# Patient Record
Sex: MALE | Race: WHITE | Employment: OTHER | URBAN - METROPOLITAN AREA
[De-identification: names, ages, dates, MRNs, and addresses within clinical notes are randomized per-mention and may not be internally consistent; named-entity substitution may affect disease eponyms.]

---

## 2018-12-07 ENCOUNTER — HOSPITAL ENCOUNTER (OUTPATIENT)
Dept: PREADMISSION TESTING | Age: 68
Discharge: HOME | End: 2018-12-07
Payer: MEDICARE

## 2018-12-07 LAB
ALBUMIN SERPL-MCNC: 3.9 G/DL (ref 3.4–5)
ALBUMIN/GLOB SERPL: 1.1 {RATIO} (ref 0.8–1.7)
ALP SERPL-CCNC: 67 U/L (ref 45–117)
ALT SERPL-CCNC: 19 U/L (ref 16–61)
ANION GAP SERPL CALC-SCNC: 8 MMOL/L (ref 3–18)
APPEARANCE UR: CLEAR
APTT PPP: 32.1 SEC (ref 23–36.4)
AST SERPL-CCNC: 15 U/L (ref 15–37)
BACTERIA SPEC CULT: NORMAL
BASOPHILS # BLD: 0.1 K/UL (ref 0–0.1)
BASOPHILS NFR BLD: 1 % (ref 0–2)
BILIRUB SERPL-MCNC: 0.5 MG/DL (ref 0.2–1)
BILIRUB UR QL: NEGATIVE
BUN SERPL-MCNC: 24 MG/DL (ref 7–18)
BUN/CREAT SERPL: 18
CALCIUM SERPL-MCNC: 8.7 MG/DL (ref 8.5–10.1)
CHLORIDE SERPL-SCNC: 103 MMOL/L (ref 100–108)
CO2 SERPL-SCNC: 26 MMOL/L (ref 21–32)
COLOR UR: YELLOW
CREAT SERPL-MCNC: 1.32 MG/DL (ref 0.6–1.3)
CRP SERPL-MCNC: 1 MG/DL (ref 0–0.3)
DIFFERENTIAL METHOD BLD: ABNORMAL
EOSINOPHIL # BLD: 0.4 K/UL (ref 0–0.4)
EOSINOPHIL NFR BLD: 4 % (ref 0–5)
ERYTHROCYTE [DISTWIDTH] IN BLOOD BY AUTOMATED COUNT: 13.8 % (ref 11.6–14.5)
ERYTHROCYTE [SEDIMENTATION RATE] IN BLOOD: 7 MM/HR (ref 0–20)
EST. AVERAGE GLUCOSE BLD GHB EST-MCNC: 137 MG/DL
GLOBULIN SER CALC-MCNC: 3.5 G/DL (ref 2–4)
GLUCOSE SERPL-MCNC: 110 MG/DL (ref 74–99)
GLUCOSE UR STRIP.AUTO-MCNC: NEGATIVE MG/DL
HBA1C MFR BLD: 6.4 % (ref 4.2–5.6)
HCT VFR BLD AUTO: 47.7 % (ref 36–48)
HGB BLD-MCNC: 15.2 G/DL (ref 13–16)
HGB UR QL STRIP: NEGATIVE
INR PPP: 0.9 (ref 0.8–1.2)
KETONES UR QL STRIP.AUTO: NEGATIVE MG/DL
LEUKOCYTE ESTERASE UR QL STRIP.AUTO: NEGATIVE
LYMPHOCYTES # BLD: 2.9 K/UL (ref 0.9–3.6)
LYMPHOCYTES NFR BLD: 27 % (ref 21–52)
MCH RBC QN AUTO: 26.6 PG (ref 24–34)
MCHC RBC AUTO-ENTMCNC: 31.9 G/DL (ref 31–37)
MCV RBC AUTO: 83.4 FL (ref 74–97)
MONOCYTES # BLD: 0.9 K/UL (ref 0.05–1.2)
MONOCYTES NFR BLD: 8 % (ref 3–10)
NEUTS SEG # BLD: 6.6 K/UL (ref 1.8–8)
NEUTS SEG NFR BLD: 60 % (ref 40–73)
NITRITE UR QL STRIP.AUTO: NEGATIVE
PH UR STRIP: 5.5 [PH] (ref 5–8)
PLATELET # BLD AUTO: 249 K/UL (ref 135–420)
PMV BLD AUTO: 9.9 FL (ref 9.2–11.8)
POTASSIUM SERPL-SCNC: 4.1 MMOL/L (ref 3.5–5.5)
PROT SERPL-MCNC: 7.4 G/DL (ref 6.4–8.2)
PROT UR STRIP-MCNC: NEGATIVE MG/DL
PROTHROMBIN TIME: 12 SEC (ref 11.5–15.2)
RBC # BLD AUTO: 5.72 M/UL (ref 4.7–5.5)
SERVICE CMNT-IMP: NORMAL
SODIUM SERPL-SCNC: 137 MMOL/L (ref 136–145)
SP GR UR REFRACTOMETRY: 1.02 (ref 1–1.03)
UROBILINOGEN UR QL STRIP.AUTO: 1 EU/DL (ref 0.2–1)
WBC # BLD AUTO: 10.8 K/UL (ref 4.6–13.2)

## 2018-12-07 PROCEDURE — 85730 THROMBOPLASTIN TIME PARTIAL: CPT

## 2018-12-07 PROCEDURE — 87086 URINE CULTURE/COLONY COUNT: CPT

## 2018-12-07 PROCEDURE — 87641 MR-STAPH DNA AMP PROBE: CPT

## 2018-12-07 PROCEDURE — 81003 URINALYSIS AUTO W/O SCOPE: CPT

## 2018-12-07 PROCEDURE — 85025 COMPLETE CBC W/AUTO DIFF WBC: CPT

## 2018-12-07 PROCEDURE — 93005 ELECTROCARDIOGRAM TRACING: CPT

## 2018-12-07 PROCEDURE — 85652 RBC SED RATE AUTOMATED: CPT

## 2018-12-07 PROCEDURE — 80053 COMPREHEN METABOLIC PANEL: CPT

## 2018-12-07 PROCEDURE — 83036 HEMOGLOBIN GLYCOSYLATED A1C: CPT

## 2018-12-07 PROCEDURE — 85610 PROTHROMBIN TIME: CPT

## 2018-12-07 PROCEDURE — 36415 COLL VENOUS BLD VENIPUNCTURE: CPT

## 2018-12-07 PROCEDURE — 86140 C-REACTIVE PROTEIN: CPT

## 2018-12-08 LAB
ATRIAL RATE: 75 BPM
CALCULATED P AXIS, ECG09: -64 DEGREES
CALCULATED R AXIS, ECG10: 34 DEGREES
CALCULATED T AXIS, ECG11: 51 DEGREES
DIAGNOSIS, 93000: NORMAL
P-R INTERVAL, ECG05: 186 MS
Q-T INTERVAL, ECG07: 386 MS
QRS DURATION, ECG06: 98 MS
QTC CALCULATION (BEZET), ECG08: 431 MS
VENTRICULAR RATE, ECG03: 75 BPM

## 2018-12-09 LAB
BACTERIA SPEC CULT: NORMAL
SERVICE CMNT-IMP: NORMAL

## 2019-01-01 ENCOUNTER — ANESTHESIA EVENT (OUTPATIENT)
Dept: SURGERY | Age: 69
DRG: 470 | End: 2019-01-01
Payer: MEDICARE

## 2019-01-01 NOTE — H&P
[address] History and Physical Exam 
 
Patient: Shana Lovett MRN: [id number]  SSN: [id number] YOB: 1950  Age: 80 y.o. Sex: male Subjective: Chief Complaint: Left knee pain History of Present Illness:  Patient complains of knee pain on the affected side and difficulty ambulating. Pain is increased with increasing activity. Pain is increased with weight bearing. Pain interferes with activities of daily living. Patient has noticed decreased range of motion. Admitting patient as inpatient acknowledging increased risk of anesthesia post-op monitoring due to adverse effects to anesthesia in the past and hypertension. Past Medical History:  
Diagnosis Date  Adverse effect of anesthesia   
 bp dropped when given meds for colonoscopy  Arthritis  Hypertension many years  Ill-defined condition 1 episode of irregular heartbeat 15 years ago, was checked, no problems found Past Surgical History:  
Procedure Laterality Date  HX OTHER SURGICAL    
 colonoscopy  HX TONSILLECTOMY  very young Social History Occupational History  Not on file Tobacco Use  Smoking status: Never Smoker  Smokeless tobacco: Never Used Substance and Sexual Activity  Alcohol use: Yes Frequency: Never Comment: rare  Drug use: No  
 Sexual activity: Not on file Prior to Admission medications Medication Sig Start Date End Date Taking? Authorizing Provider  
metoprolol tartrate (LOPRESSOR) 50 mg tablet Take 75 mg by mouth nightly. Provider, Historical  
valsartan-hydroCHLOROthiazide (DIOVAN-HCT) 160-25 mg per tablet Take 1 Tab by mouth daily. Provider, Historical  
aspirin delayed-release 81 mg tablet Take 81 mg by mouth nightly. Provider, Historical  
multivitamin (DAILY MULTIPLE) tablet Take 1 Tab by mouth daily. Provider, Historical  
 
 
Family History Non-contributory. Allergies: No known drug allergies Review of Systems: A comprehensive review of systems was negative. Objective:   
  
Physical Exam: 
General:  Alert, oriented, pleasant, well-groomed HEENT:  Normocephalic, atraumatic Lungs:   Clear to auscultation Heart:    Regular rate and rhythm Abdomen:  Soft, non-tender Extremities:   Pain with range of motion of the affected knee ROM: decreased Crepitus with motion of affected knee Mild effusion Xrays:  
Xrays demonstrate severe arthritic changes including sunchondral cysts, subchondral sclerosis, periarticular osteophytes, and joint space narrowing. Assessment:   
 
Arthritis of the affected knee. This has significantly affected the patient's quality of life. It interferes with activities of daily living. It is worse with weight bearing and activity. Plan:    
 
Proceed with scheduled left total knee. The patient has failed conservative measures including anti-inflammatories, injections, activity modification, and a trial of physical therapy or external joint support which includes a home exercise program. 
 
The various methods of treatment have been discussed with the patient and family. After consideration of risks, benefits, and other options for treatment, the patient has consented to surgical interventions. Questions were answered and preoperative teaching was done by Dr. Florentino Ulloa. Signed By: Viktoriya Santamaria PA-C January 1, 2019

## 2019-01-02 ENCOUNTER — APPOINTMENT (OUTPATIENT)
Dept: GENERAL RADIOLOGY | Age: 69
DRG: 470 | End: 2019-01-02
Attending: PHYSICIAN ASSISTANT
Payer: MEDICARE

## 2019-01-02 ENCOUNTER — HOSPITAL ENCOUNTER (INPATIENT)
Age: 69
LOS: 1 days | Discharge: HOME HEALTH | DRG: 470 | End: 2019-01-03
Attending: ORTHOPAEDIC SURGERY | Admitting: ORTHOPAEDIC SURGERY
Payer: MEDICARE

## 2019-01-02 ENCOUNTER — ANESTHESIA (OUTPATIENT)
Dept: SURGERY | Age: 69
DRG: 470 | End: 2019-01-02
Payer: MEDICARE

## 2019-01-02 DIAGNOSIS — M17.12 PRIMARY OSTEOARTHRITIS OF LEFT KNEE: Primary | ICD-10-CM

## 2019-01-02 LAB
ABO + RH BLD: NORMAL
BLOOD GROUP ANTIBODIES SERPL: NORMAL
GLUCOSE BLD STRIP.AUTO-MCNC: 172 MG/DL (ref 70–110)
SPECIMEN EXP DATE BLD: NORMAL

## 2019-01-02 PROCEDURE — 77030031139 HC SUT VCRL2 J&J -A: Performed by: ORTHOPAEDIC SURGERY

## 2019-01-02 PROCEDURE — 77030027138 HC INCENT SPIROMETER -A: Performed by: ORTHOPAEDIC SURGERY

## 2019-01-02 PROCEDURE — 77030039267 HC ADH SKN EXOFIN S2SG -B: Performed by: ORTHOPAEDIC SURGERY

## 2019-01-02 PROCEDURE — 74011250636 HC RX REV CODE- 250/636: Performed by: ORTHOPAEDIC SURGERY

## 2019-01-02 PROCEDURE — 82962 GLUCOSE BLOOD TEST: CPT

## 2019-01-02 PROCEDURE — C9290 INJ, BUPIVACAINE LIPOSOME: HCPCS | Performed by: ORTHOPAEDIC SURGERY

## 2019-01-02 PROCEDURE — 74011250636 HC RX REV CODE- 250/636: Performed by: PHYSICIAN ASSISTANT

## 2019-01-02 PROCEDURE — 74011000250 HC RX REV CODE- 250

## 2019-01-02 PROCEDURE — 74011000258 HC RX REV CODE- 258: Performed by: PHYSICIAN ASSISTANT

## 2019-01-02 PROCEDURE — 77030037875 HC DRSG MEPILEX <16IN BORD MOLN -A: Performed by: ORTHOPAEDIC SURGERY

## 2019-01-02 PROCEDURE — 77030018846 HC SOL IRR STRL H20 ICUM -A: Performed by: ORTHOPAEDIC SURGERY

## 2019-01-02 PROCEDURE — 77030020782 HC GWN BAIR PAWS FLX 3M -B: Performed by: ORTHOPAEDIC SURGERY

## 2019-01-02 PROCEDURE — 76010000153 HC OR TIME 1.5 TO 2 HR: Performed by: ORTHOPAEDIC SURGERY

## 2019-01-02 PROCEDURE — 77010033678 HC OXYGEN DAILY

## 2019-01-02 PROCEDURE — 86900 BLOOD TYPING SEROLOGIC ABO: CPT

## 2019-01-02 PROCEDURE — 74011250636 HC RX REV CODE- 250/636

## 2019-01-02 PROCEDURE — 77030018835 HC SOL IRR LR ICUM -A: Performed by: ORTHOPAEDIC SURGERY

## 2019-01-02 PROCEDURE — 74011250637 HC RX REV CODE- 250/637: Performed by: PHYSICIAN ASSISTANT

## 2019-01-02 PROCEDURE — 77030018836 HC SOL IRR NACL ICUM -A: Performed by: ORTHOPAEDIC SURGERY

## 2019-01-02 PROCEDURE — 77030032489 HC SLV COMPR SCD FT CUF COVD -B: Performed by: ORTHOPAEDIC SURGERY

## 2019-01-02 PROCEDURE — 64447 NJX AA&/STRD FEMORAL NRV IMG: CPT | Performed by: ORTHOPAEDIC SURGERY

## 2019-01-02 PROCEDURE — 76942 ECHO GUIDE FOR BIOPSY: CPT | Performed by: ORTHOPAEDIC SURGERY

## 2019-01-02 PROCEDURE — 74011000258 HC RX REV CODE- 258

## 2019-01-02 PROCEDURE — 0SRD0JA REPLACEMENT OF LEFT KNEE JOINT WITH SYNTHETIC SUBSTITUTE, UNCEMENTED, OPEN APPROACH: ICD-10-PCS | Performed by: ORTHOPAEDIC SURGERY

## 2019-01-02 PROCEDURE — 76060000034 HC ANESTHESIA 1.5 TO 2 HR: Performed by: ORTHOPAEDIC SURGERY

## 2019-01-02 PROCEDURE — 65270000029 HC RM PRIVATE

## 2019-01-02 PROCEDURE — 77030035643 HC BLD SAW OSC PRECIS STRY -C: Performed by: ORTHOPAEDIC SURGERY

## 2019-01-02 PROCEDURE — 74011000250 HC RX REV CODE- 250: Performed by: ORTHOPAEDIC SURGERY

## 2019-01-02 PROCEDURE — 74011000250 HC RX REV CODE- 250: Performed by: PHYSICIAN ASSISTANT

## 2019-01-02 PROCEDURE — 36415 COLL VENOUS BLD VENIPUNCTURE: CPT

## 2019-01-02 PROCEDURE — 77030000032 HC CUF TRNQT ZIMM -B: Performed by: ORTHOPAEDIC SURGERY

## 2019-01-02 PROCEDURE — 74011000258 HC RX REV CODE- 258: Performed by: ORTHOPAEDIC SURGERY

## 2019-01-02 PROCEDURE — 74011636637 HC RX REV CODE- 636/637: Performed by: PHYSICIAN ASSISTANT

## 2019-01-02 PROCEDURE — C1776 JOINT DEVICE (IMPLANTABLE): HCPCS | Performed by: ORTHOPAEDIC SURGERY

## 2019-01-02 PROCEDURE — 77030002933 HC SUT MCRYL J&J -A: Performed by: ORTHOPAEDIC SURGERY

## 2019-01-02 PROCEDURE — 77030036563 HC WRP CLD THER KNE S2SG -B: Performed by: ORTHOPAEDIC SURGERY

## 2019-01-02 PROCEDURE — 73560 X-RAY EXAM OF KNEE 1 OR 2: CPT

## 2019-01-02 PROCEDURE — 77030013708 HC HNDPC SUC IRR PULS STRY –B: Performed by: ORTHOPAEDIC SURGERY

## 2019-01-02 PROCEDURE — 74011250637 HC RX REV CODE- 250/637: Performed by: SPECIALIST

## 2019-01-02 PROCEDURE — C1713 ANCHOR/SCREW BN/BN,TIS/BN: HCPCS | Performed by: ORTHOPAEDIC SURGERY

## 2019-01-02 PROCEDURE — 76210000006 HC OR PH I REC 0.5 TO 1 HR: Performed by: ORTHOPAEDIC SURGERY

## 2019-01-02 DEVICE — IMPLANTABLE DEVICE: Type: IMPLANTABLE DEVICE | Site: KNEE | Status: FUNCTIONAL

## 2019-01-02 DEVICE — BASEPLATE TIB SZ 6 AP52MM ML77MM KNEE TRITANIUM 4 CRUCFRM: Type: IMPLANTABLE DEVICE | Site: KNEE | Status: FUNCTIONAL

## 2019-01-02 DEVICE — COMPONENT PAT SZ A40 W44MM DIA40MM THK11MM MED LAT KNEE: Type: IMPLANTABLE DEVICE | Site: KNEE | Status: FUNCTIONAL

## 2019-01-02 DEVICE — COMPONENT TOT KNEE HYBRID POROUS X3 TRIATHLON: Type: IMPLANTABLE DEVICE | Site: KNEE | Status: FUNCTIONAL

## 2019-01-02 DEVICE — COMPNT FEM CR TRIATHLN 5 L PA --: Type: IMPLANTABLE DEVICE | Site: KNEE | Status: FUNCTIONAL

## 2019-01-02 RX ORDER — HYDROMORPHONE HYDROCHLORIDE 2 MG/ML
0.2 INJECTION, SOLUTION INTRAMUSCULAR; INTRAVENOUS; SUBCUTANEOUS
Status: DISCONTINUED | OUTPATIENT
Start: 2019-01-02 | End: 2019-01-02 | Stop reason: HOSPADM

## 2019-01-02 RX ORDER — MAGNESIUM SULFATE 100 %
4 CRYSTALS MISCELLANEOUS AS NEEDED
Status: DISCONTINUED | OUTPATIENT
Start: 2019-01-02 | End: 2019-01-03 | Stop reason: HOSPADM

## 2019-01-02 RX ORDER — DEXTROSE 50 % IN WATER (D50W) INTRAVENOUS SYRINGE
25-50 AS NEEDED
Status: DISCONTINUED | OUTPATIENT
Start: 2019-01-02 | End: 2019-01-03 | Stop reason: HOSPADM

## 2019-01-02 RX ORDER — PROPOFOL 10 MG/ML
INJECTION, EMULSION INTRAVENOUS AS NEEDED
Status: DISCONTINUED | OUTPATIENT
Start: 2019-01-02 | End: 2019-01-02 | Stop reason: HOSPADM

## 2019-01-02 RX ORDER — CELECOXIB 100 MG/1
200 CAPSULE ORAL 2 TIMES DAILY
Status: DISCONTINUED | OUTPATIENT
Start: 2019-01-02 | End: 2019-01-03 | Stop reason: HOSPADM

## 2019-01-02 RX ORDER — INSULIN LISPRO 100 [IU]/ML
INJECTION, SOLUTION INTRAVENOUS; SUBCUTANEOUS
Status: DISCONTINUED | OUTPATIENT
Start: 2019-01-02 | End: 2019-01-03 | Stop reason: HOSPADM

## 2019-01-02 RX ORDER — HYDROMORPHONE HYDROCHLORIDE 1 MG/ML
1 INJECTION, SOLUTION INTRAMUSCULAR; INTRAVENOUS; SUBCUTANEOUS
Status: DISCONTINUED | OUTPATIENT
Start: 2019-01-02 | End: 2019-01-03 | Stop reason: HOSPADM

## 2019-01-02 RX ORDER — LIDOCAINE HYDROCHLORIDE 20 MG/ML
INJECTION, SOLUTION EPIDURAL; INFILTRATION; INTRACAUDAL; PERINEURAL AS NEEDED
Status: DISCONTINUED | OUTPATIENT
Start: 2019-01-02 | End: 2019-01-02 | Stop reason: HOSPADM

## 2019-01-02 RX ORDER — SODIUM CHLORIDE 9 MG/ML
125 INJECTION, SOLUTION INTRAVENOUS CONTINUOUS
Status: DISCONTINUED | OUTPATIENT
Start: 2019-01-02 | End: 2019-01-02 | Stop reason: HOSPADM

## 2019-01-02 RX ORDER — MAGNESIUM SULFATE 100 %
4 CRYSTALS MISCELLANEOUS AS NEEDED
Status: DISCONTINUED | OUTPATIENT
Start: 2019-01-02 | End: 2019-01-02 | Stop reason: HOSPADM

## 2019-01-02 RX ORDER — DIPHENHYDRAMINE HYDROCHLORIDE 50 MG/ML
12.5 INJECTION, SOLUTION INTRAMUSCULAR; INTRAVENOUS
Status: DISCONTINUED | OUTPATIENT
Start: 2019-01-02 | End: 2019-01-03 | Stop reason: HOSPADM

## 2019-01-02 RX ORDER — FENTANYL CITRATE 50 UG/ML
25 INJECTION, SOLUTION INTRAMUSCULAR; INTRAVENOUS AS NEEDED
Status: DISCONTINUED | OUTPATIENT
Start: 2019-01-02 | End: 2019-01-02 | Stop reason: HOSPADM

## 2019-01-02 RX ORDER — SODIUM CHLORIDE, SODIUM LACTATE, POTASSIUM CHLORIDE, CALCIUM CHLORIDE 600; 310; 30; 20 MG/100ML; MG/100ML; MG/100ML; MG/100ML
125 INJECTION, SOLUTION INTRAVENOUS CONTINUOUS
Status: DISCONTINUED | OUTPATIENT
Start: 2019-01-02 | End: 2019-01-03 | Stop reason: HOSPADM

## 2019-01-02 RX ORDER — CELECOXIB 100 MG/1
200 CAPSULE ORAL ONCE
Status: COMPLETED | OUTPATIENT
Start: 2019-01-02 | End: 2019-01-02

## 2019-01-02 RX ORDER — DEXMEDETOMIDINE HYDROCHLORIDE 4 UG/ML
INJECTION, SOLUTION INTRAVENOUS AS NEEDED
Status: DISCONTINUED | OUTPATIENT
Start: 2019-01-02 | End: 2019-01-02 | Stop reason: HOSPADM

## 2019-01-02 RX ORDER — SODIUM CHLORIDE 0.9 % (FLUSH) 0.9 %
5-10 SYRINGE (ML) INJECTION EVERY 8 HOURS
Status: DISCONTINUED | OUTPATIENT
Start: 2019-01-02 | End: 2019-01-03 | Stop reason: HOSPADM

## 2019-01-02 RX ORDER — METOCLOPRAMIDE HYDROCHLORIDE 5 MG/ML
INJECTION INTRAMUSCULAR; INTRAVENOUS AS NEEDED
Status: DISCONTINUED | OUTPATIENT
Start: 2019-01-02 | End: 2019-01-02 | Stop reason: HOSPADM

## 2019-01-02 RX ORDER — DOCUSATE SODIUM 100 MG/1
100 CAPSULE, LIQUID FILLED ORAL 2 TIMES DAILY
Status: DISCONTINUED | OUTPATIENT
Start: 2019-01-02 | End: 2019-01-03 | Stop reason: HOSPADM

## 2019-01-02 RX ORDER — ACETAMINOPHEN 500 MG
1000 TABLET ORAL ONCE
Status: COMPLETED | OUTPATIENT
Start: 2019-01-02 | End: 2019-01-02

## 2019-01-02 RX ORDER — KETAMINE HYDROCHLORIDE 10 MG/ML
INJECTION, SOLUTION INTRAMUSCULAR; INTRAVENOUS AS NEEDED
Status: DISCONTINUED | OUTPATIENT
Start: 2019-01-02 | End: 2019-01-02 | Stop reason: HOSPADM

## 2019-01-02 RX ORDER — NALOXONE HYDROCHLORIDE 0.4 MG/ML
0.1 INJECTION, SOLUTION INTRAMUSCULAR; INTRAVENOUS; SUBCUTANEOUS AS NEEDED
Status: DISCONTINUED | OUTPATIENT
Start: 2019-01-02 | End: 2019-01-02 | Stop reason: HOSPADM

## 2019-01-02 RX ORDER — GLYCOPYRROLATE 0.2 MG/ML
INJECTION INTRAMUSCULAR; INTRAVENOUS AS NEEDED
Status: DISCONTINUED | OUTPATIENT
Start: 2019-01-02 | End: 2019-01-02 | Stop reason: HOSPADM

## 2019-01-02 RX ORDER — PHENYLEPHRINE HCL IN 0.9% NACL 1 MG/10 ML
SYRINGE (ML) INTRAVENOUS AS NEEDED
Status: DISCONTINUED | OUTPATIENT
Start: 2019-01-02 | End: 2019-01-02 | Stop reason: HOSPADM

## 2019-01-02 RX ORDER — MIDAZOLAM HYDROCHLORIDE 1 MG/ML
INJECTION, SOLUTION INTRAMUSCULAR; INTRAVENOUS AS NEEDED
Status: DISCONTINUED | OUTPATIENT
Start: 2019-01-02 | End: 2019-01-02 | Stop reason: HOSPADM

## 2019-01-02 RX ORDER — PREGABALIN 25 MG/1
25 CAPSULE ORAL ONCE
Status: COMPLETED | OUTPATIENT
Start: 2019-01-02 | End: 2019-01-02

## 2019-01-02 RX ORDER — MORPHINE SULFATE 2 MG/ML
2 INJECTION, SOLUTION INTRAMUSCULAR; INTRAVENOUS
Status: DISCONTINUED | OUTPATIENT
Start: 2019-01-02 | End: 2019-01-02 | Stop reason: HOSPADM

## 2019-01-02 RX ORDER — NALOXONE HYDROCHLORIDE 0.4 MG/ML
0.4 INJECTION, SOLUTION INTRAMUSCULAR; INTRAVENOUS; SUBCUTANEOUS AS NEEDED
Status: DISCONTINUED | OUTPATIENT
Start: 2019-01-02 | End: 2019-01-03 | Stop reason: HOSPADM

## 2019-01-02 RX ORDER — DEXAMETHASONE SODIUM PHOSPHATE 4 MG/ML
INJECTION, SOLUTION INTRA-ARTICULAR; INTRALESIONAL; INTRAMUSCULAR; INTRAVENOUS; SOFT TISSUE AS NEEDED
Status: DISCONTINUED | OUTPATIENT
Start: 2019-01-02 | End: 2019-01-02 | Stop reason: HOSPADM

## 2019-01-02 RX ORDER — FENTANYL CITRATE 50 UG/ML
50 INJECTION, SOLUTION INTRAMUSCULAR; INTRAVENOUS
Status: DISCONTINUED | OUTPATIENT
Start: 2019-01-02 | End: 2019-01-02 | Stop reason: HOSPADM

## 2019-01-02 RX ORDER — ONDANSETRON 2 MG/ML
4 INJECTION INTRAMUSCULAR; INTRAVENOUS ONCE
Status: DISCONTINUED | OUTPATIENT
Start: 2019-01-02 | End: 2019-01-02 | Stop reason: HOSPADM

## 2019-01-02 RX ORDER — MORPHINE SULFATE 2 MG/ML
1 INJECTION, SOLUTION INTRAMUSCULAR; INTRAVENOUS
Status: DISCONTINUED | OUTPATIENT
Start: 2019-01-02 | End: 2019-01-02 | Stop reason: HOSPADM

## 2019-01-02 RX ORDER — DEXTROSE 50 % IN WATER (D50W) INTRAVENOUS SYRINGE
25-50 AS NEEDED
Status: DISCONTINUED | OUTPATIENT
Start: 2019-01-02 | End: 2019-01-02 | Stop reason: HOSPADM

## 2019-01-02 RX ORDER — SODIUM CHLORIDE, SODIUM LACTATE, POTASSIUM CHLORIDE, CALCIUM CHLORIDE 600; 310; 30; 20 MG/100ML; MG/100ML; MG/100ML; MG/100ML
300 INJECTION, SOLUTION INTRAVENOUS CONTINUOUS
Status: DISCONTINUED | OUTPATIENT
Start: 2019-01-02 | End: 2019-01-03 | Stop reason: HOSPADM

## 2019-01-02 RX ORDER — SODIUM CHLORIDE 0.9 % (FLUSH) 0.9 %
5-10 SYRINGE (ML) INJECTION AS NEEDED
Status: DISCONTINUED | OUTPATIENT
Start: 2019-01-02 | End: 2019-01-03 | Stop reason: HOSPADM

## 2019-01-02 RX ORDER — ONDANSETRON 2 MG/ML
4 INJECTION INTRAMUSCULAR; INTRAVENOUS
Status: DISCONTINUED | OUTPATIENT
Start: 2019-01-02 | End: 2019-01-03 | Stop reason: HOSPADM

## 2019-01-02 RX ORDER — OXYCODONE AND ACETAMINOPHEN 5; 325 MG/1; MG/1
1-2 TABLET ORAL
Status: DISCONTINUED | OUTPATIENT
Start: 2019-01-02 | End: 2019-01-03 | Stop reason: HOSPADM

## 2019-01-02 RX ORDER — ONDANSETRON 2 MG/ML
INJECTION INTRAMUSCULAR; INTRAVENOUS AS NEEDED
Status: DISCONTINUED | OUTPATIENT
Start: 2019-01-02 | End: 2019-01-02 | Stop reason: HOSPADM

## 2019-01-02 RX ORDER — HYDROMORPHONE HYDROCHLORIDE 2 MG/ML
0.5 INJECTION, SOLUTION INTRAMUSCULAR; INTRAVENOUS; SUBCUTANEOUS
Status: DISCONTINUED | OUTPATIENT
Start: 2019-01-02 | End: 2019-01-02 | Stop reason: HOSPADM

## 2019-01-02 RX ADMIN — MIDAZOLAM HYDROCHLORIDE 2 MG: 1 INJECTION, SOLUTION INTRAMUSCULAR; INTRAVENOUS at 15:40

## 2019-01-02 RX ADMIN — LIDOCAINE HYDROCHLORIDE 40 MG: 20 INJECTION, SOLUTION EPIDURAL; INFILTRATION; INTRACAUDAL; PERINEURAL at 15:47

## 2019-01-02 RX ADMIN — METOPROLOL TARTRATE 75 MG: 50 TABLET ORAL at 23:02

## 2019-01-02 RX ADMIN — Medication 150 MCG: at 16:48

## 2019-01-02 RX ADMIN — CEFAZOLIN SODIUM 3 G: 10 INJECTION, POWDER, FOR SOLUTION INTRAVENOUS at 15:51

## 2019-01-02 RX ADMIN — Medication 150 MCG: at 16:36

## 2019-01-02 RX ADMIN — METOCLOPRAMIDE HYDROCHLORIDE 10 MG: 5 INJECTION INTRAMUSCULAR; INTRAVENOUS at 16:01

## 2019-01-02 RX ADMIN — GLYCOPYRROLATE 0.1 MG: 0.2 INJECTION INTRAMUSCULAR; INTRAVENOUS at 16:09

## 2019-01-02 RX ADMIN — Medication 100 MCG: at 17:00

## 2019-01-02 RX ADMIN — Medication 200 MCG: at 16:58

## 2019-01-02 RX ADMIN — GLYCOPYRROLATE 0.1 MG: 0.2 INJECTION INTRAMUSCULAR; INTRAVENOUS at 16:07

## 2019-01-02 RX ADMIN — CELECOXIB 200 MG: 100 CAPSULE ORAL at 13:25

## 2019-01-02 RX ADMIN — PROPOFOL 200 MG: 10 INJECTION, EMULSION INTRAVENOUS at 15:47

## 2019-01-02 RX ADMIN — Medication 150 MCG: at 16:42

## 2019-01-02 RX ADMIN — CELECOXIB 200 MG: 100 CAPSULE ORAL at 21:10

## 2019-01-02 RX ADMIN — SODIUM CHLORIDE, SODIUM LACTATE, POTASSIUM CHLORIDE, AND CALCIUM CHLORIDE 125 ML/HR: 600; 310; 30; 20 INJECTION, SOLUTION INTRAVENOUS at 17:50

## 2019-01-02 RX ADMIN — DEXMEDETOMIDINE HYDROCHLORIDE 8 MCG: 4 INJECTION, SOLUTION INTRAVENOUS at 16:04

## 2019-01-02 RX ADMIN — KETAMINE HYDROCHLORIDE 20 MG: 10 INJECTION, SOLUTION INTRAMUSCULAR; INTRAVENOUS at 15:54

## 2019-01-02 RX ADMIN — PREGABALIN 25 MG: 25 CAPSULE ORAL at 13:25

## 2019-01-02 RX ADMIN — KETAMINE HYDROCHLORIDE 10 MG: 10 INJECTION, SOLUTION INTRAMUSCULAR; INTRAVENOUS at 16:23

## 2019-01-02 RX ADMIN — DEXMEDETOMIDINE HYDROCHLORIDE 8 MCG: 4 INJECTION, SOLUTION INTRAVENOUS at 16:14

## 2019-01-02 RX ADMIN — ONDANSETRON 4 MG: 2 INJECTION INTRAMUSCULAR; INTRAVENOUS at 16:00

## 2019-01-02 RX ADMIN — Medication 200 MCG: at 16:51

## 2019-01-02 RX ADMIN — DOCUSATE SODIUM 100 MG: 100 CAPSULE, LIQUID FILLED ORAL at 21:10

## 2019-01-02 RX ADMIN — KETAMINE HYDROCHLORIDE 10 MG: 10 INJECTION, SOLUTION INTRAMUSCULAR; INTRAVENOUS at 15:59

## 2019-01-02 RX ADMIN — DEXAMETHASONE SODIUM PHOSPHATE 4 MG: 4 INJECTION, SOLUTION INTRA-ARTICULAR; INTRALESIONAL; INTRAMUSCULAR; INTRAVENOUS; SOFT TISSUE at 16:26

## 2019-01-02 RX ADMIN — SODIUM CHLORIDE, SODIUM LACTATE, POTASSIUM CHLORIDE, AND CALCIUM CHLORIDE: 600; 310; 30; 20 INJECTION, SOLUTION INTRAVENOUS at 16:15

## 2019-01-02 RX ADMIN — SODIUM CHLORIDE, SODIUM LACTATE, POTASSIUM CHLORIDE, AND CALCIUM CHLORIDE 125 ML/HR: 600; 310; 30; 20 INJECTION, SOLUTION INTRAVENOUS at 13:16

## 2019-01-02 RX ADMIN — SODIUM CHLORIDE, SODIUM LACTATE, POTASSIUM CHLORIDE, AND CALCIUM CHLORIDE 125 ML/HR: 600; 310; 30; 20 INJECTION, SOLUTION INTRAVENOUS at 23:03

## 2019-01-02 RX ADMIN — TRANEXAMIC ACID 1 G: 100 INJECTION, SOLUTION INTRAVENOUS at 15:53

## 2019-01-02 RX ADMIN — KETAMINE HYDROCHLORIDE 10 MG: 10 INJECTION, SOLUTION INTRAMUSCULAR; INTRAVENOUS at 16:11

## 2019-01-02 RX ADMIN — SODIUM CHLORIDE, SODIUM LACTATE, POTASSIUM CHLORIDE, AND CALCIUM CHLORIDE 300 ML/HR: 600; 310; 30; 20 INJECTION, SOLUTION INTRAVENOUS at 21:10

## 2019-01-02 RX ADMIN — Medication 100 MCG: at 16:18

## 2019-01-02 RX ADMIN — Medication 150 MCG: at 16:56

## 2019-01-02 RX ADMIN — Medication 150 MCG: at 16:44

## 2019-01-02 RX ADMIN — INSULIN LISPRO 2 UNITS: 100 INJECTION, SOLUTION INTRAVENOUS; SUBCUTANEOUS at 23:03

## 2019-01-02 RX ADMIN — TRANEXAMIC ACID 1 G: 100 INJECTION, SOLUTION INTRAVENOUS at 16:43

## 2019-01-02 RX ADMIN — ACETAMINOPHEN 1000 MG: 500 TABLET ORAL at 13:25

## 2019-01-02 RX ADMIN — DEXMEDETOMIDINE HYDROCHLORIDE 8 MCG: 4 INJECTION, SOLUTION INTRAVENOUS at 16:09

## 2019-01-02 RX ADMIN — Medication 100 MCG: at 16:25

## 2019-01-02 RX ADMIN — KETAMINE HYDROCHLORIDE 10 MG: 10 INJECTION, SOLUTION INTRAMUSCULAR; INTRAVENOUS at 16:14

## 2019-01-02 NOTE — INTERVAL H&P NOTE
H&P Update: 
Sejal Piper was seen and examined. History and physical has been reviewed. The patient has been examined. There have been no significant clinical changes since the completion of the originally dated History and Physical. 
Patient identified by surgeon; surgical site was confirmed by patient and surgeon.  
 
Signed By: Kelly Cisneros MD   
 January 2, 2019 3:07 PM

## 2019-01-02 NOTE — PERIOP NOTES
TRANSFER - OUT REPORT: 
 
Verbal report given to Nahomi Sethi RN(name) on Brenna Garcia  being transferred to room 215(unit) for routine post - op Report consisted of patients Situation, Background, Assessment and  
Recommendations(SBAR). Information from the following report(s) SBAR was reviewed with the receiving nurse. Intake/Output Summary (Last 24 hours) at 1/2/2019 1829 Last data filed at 1/2/2019 1755 Gross per 24 hour Intake 2100 ml Output 50 ml Net 2050 ml Lines:  
Peripheral IV 01/02/19 Right; Inner Forearm (Active) Site Assessment Clean, dry, & intact 1/2/2019  5:55 PM  
Phlebitis Assessment 0 1/2/2019  5:55 PM  
Infiltration Assessment 0 1/2/2019  5:55 PM  
Dressing Status Clean, dry, & intact 1/2/2019  5:55 PM  
Dressing Type Transparent 1/2/2019  5:55 PM  
Hub Color/Line Status Green; Infusing 1/2/2019  5:55 PM  
Alcohol Cap Used No 1/2/2019  1:19 PM  
  
 
Opportunity for questions and clarification was provided. Patient transported with: 
 O2 @ 2 liters

## 2019-01-02 NOTE — PERIOP NOTES
Called into room 5 and spoke with circulator. Asked that she inform  that the patient has a few small abrasions to his left lower shin. She states that she will let him know as soon as possible so that he can assess the patient's leg before surgery.

## 2019-01-02 NOTE — BRIEF OP NOTE
BRIEF OPERATIVE NOTE Date of Procedure: 1/2/2019 Preoperative Diagnosis: UNILATERAL PRIMARY OSTEOARTHRITIS LEFT KNEE Postoperative Diagnosis: UNILATERAL PRIMARY OSTEOARTHRITIS LEFT KNEE Procedure(s): LEFT TOTAL KNEE ARTHROPLASTY Surgeon(s) and Role: Dilshad Jernigan MD - Primary Surgical Assistant: none Surgical Staff: 
Circ-1: Dena Daley RN Physician Assistant: Layla Antonio PA-C Scrub Tech-1: Vicky Zaragoza Scrub RN-1: Lisandro Marin RN Event Time In Time Out Incision Start  Incision Close 1713 Anesthesia: General  
Estimated Blood Loss: 50cc Specimens: * No specimens in log * Findings: severe DJD Complications: none Implants:  
Implant Name Type Inv. Item Serial No.  Lot No. LRB No. Used Action PAT ASYM MTL-BK 11MM SZ A40 -- TRIATHLON - [id number]  PAT ASYM MTL-BK 11MM SZ A40 -- TRIATHLON  [employer] HOW [id number] Left 1 Implanted INSERT TIB CR TRIATHLN X3 6 16 --  - [id number]  INSERT TIB CR TRIATHLN X3 6 16 --   [employer] HOW [id number] Left 1 Implanted BASEPLT TIB PC TRITNM SZ 6 -- TRIATHLON - [id number]  BASEPLT TIB PC TRITNM SZ 6 -- TRIATHLON  [facility] HOW [id number] Left 1 Implanted COMPNT FEM CR TRIATHLN 5 L PA --  - [id number]  COMPNT FEM CR TRIATHLN 5 L PA --   [employer] HOW [id number] Left 1 Implanted

## 2019-01-02 NOTE — PERIOP NOTES
Reviewed PTA medication list with patient/caregiver and patient/caregiver denies any additional medications. Patient admits to having a responsible adult care for them at home for at least 24 hours after surgery. Pt denies having an active cough and confirms being able to lie still in the supine position for at least 20 minutes. Patient denies domingo chewing/swallowing difficulties. Dual skin assessment & fall risk band verification completed with Chandrika Dash RN.

## 2019-01-02 NOTE — ANESTHESIA PREPROCEDURE EVALUATION
Anesthetic History No history of anesthetic complications Comments: Hypotension about 20 years ago =- colonoscopy after prep. Review of Systems / Medical History Patient summary reviewed, nursing notes reviewed and pertinent labs reviewed Pulmonary Pertinent negatives: No COPD, asthma and smoker Neuro/Psych Within defined limits Cardiovascular Hypertension: well controlled Pertinent negatives: No past MI and CAD 
 
  
GI/Hepatic/Renal 
Within defined limits Pertinent negatives: No GERD, liver disease and renal disease Endo/Other Arthritis Pertinent negatives: No diabetes Other Findings Comments: ETOH neg Physical Exam 
 
Airway Mallampati: III 
TM Distance: 4 - 6 cm Neck ROM: normal range of motion Mouth opening: Normal 
 
 Cardiovascular Dental 
 
Dentition: Caps/crowns Pulmonary Abdominal 
 
 
 
 Other Findings Anesthetic Plan ASA: 2 Anesthesia type: regional and general - femoral single shot Induction: Intravenous Anesthetic plan and risks discussed with: Patient Risk of nerve injury discussed. Can be expressed by pain, numbness, weakness. May or may not recover.

## 2019-01-02 NOTE — ANESTHESIA POSTPROCEDURE EVALUATION
Post-Anesthesia Evaluation & Assessment Visit Vitals /56 Pulse 79 Temp 36.8 °C (98.2 °F) Resp 15 Ht 5' 8\" (1.727 m) Wt 121.6 kg (268 lb) SpO2 96% BMI 40.75 kg/m² Nausea/Vomiting: Controlled. Post-operative hydration adequate. Pain Scale 1: FLACC (01/02/19 1755) Pain Intensity 1: 0 (01/02/19 1755) Managed Pain score at or below stated goal level. Mental status & Level of consciousness: alert and oriented x 3 Neurological status: moves all extremities, sensation grossly intact Pulmonary status: airway patent, adequate oxygenation. Complications related to anesthesia: none Patient has met all PACU discharge requirements.  
 
 
Kimberly Gutierrez DO

## 2019-01-02 NOTE — OP NOTES
TOTAL KNEE ARTHROPLASTY OP NOTE      OPERATIVE REPORT    Patient: Annette Ospina CSN: [id number] SSN: [id number]    YOB: 1950  Age: 80 y.o. Sex: male      Admit Date: 1/2/2019  Admit Diagnosis: UNILATERAL PRIMARY OSTEOARTHRITIS LEFT KNEE  Osteoarthritis of left knee    Date of Procedure: 1/2/2019   Preoperative Diagnosis: UNILATERAL PRIMARY OSTEOARTHRITIS LEFT KNEE  Postoperative Diagnosis: UNILATERAL PRIMARY OSTEOARTHRITIS LEFT KNEE    Procedure: Procedure(s):  LEFT TOTAL KNEE ARTHROPLASTY  Surgeon: Fermín Ellis MD  Assistant(s):  CASTILLO Davison  Anesthesia: General   Estimated Blood Loss:<50CC  Specimens: * No specimens in log *   Complications: None  Implants:   Implant Name Type Inv. Item Serial No.  Lot No. LRB No. Used Action   PAT ASYM MTL-BK 11MM SZ A40 -- TRIATHLON - [id number]  PAT ASYM MTL-BK 11MM SZ A40 -- TRIATHLON  [employer] [facility] [id number] Left 1 Implanted   INSERT TIB CR TRIATHLN X3 6 16 --  - [id number]  INSERT TIB CR TRIATHLN X3 6 16 --   [employer] HOW [id number] Left 1 Implanted   BASEPLT TIB PC TRITNM SZ 6 -- TRIATHLON - [id number]  BASEPLT TIB PC TRITNM SZ 6 -- TRIATHLON  [employer] HOW [id number] Left 1 Implanted   COMPNT FEM CR TRIATHLN 5 L PA --  - [id number]  COMPNT FEM CR TRIATHLN 5 L PA --   [employer] HOW [id number] Left 1 Implanted         INDICATIONS: The patient is a 80 y.o., male who has who has been suffering from knee arthritis. He has failed conservative therapy including activity modification, anti-inflammatories and injections. The arthritis is significantly impacting the patient's quality of life. We did discuss the option of total knee arthroplasty with them at length. He understood the risks and benefits including the risks of infection and blood clot, and elected to proceed with knee replacement.     DESCRIPTION OF PROCEDURE: After being informed of the risks and benefits, which include, but are not limited to, bleeding, infection, neurovascular damage, wound complications, pain and stiffness in the knee, periprosthetic loosening, fracture dislocation and DVT, the patient consented for the procedure. The patient was brought to the operating suite and properly identified. He was placed supine upon the operating table and placed under a general anesthetic. A regional block was placed in preoperative holding. Once an adequate level of anesthesia was obtained, a tourniquet was placed high on the operative thigh. The leg was prepped and draped in the usual sterile fashion. The leg was exsanguinated with an Esmarch. The tourniquet was inflated to 280 mmHg. Tourniquet time was approximately 1 hour. A longitudinal incision was made centered over the patella. Dissection was carried down through the subcutaneous tissue. The underlying capsule was identified and a medial parapatellar arthrotomy was made in standard fashion. The joint was exposed and evaluated. There were severe arthritic changes noted. The patella was everted. Approximately 1 cm of the articular surface was removed in a freehand fashion. Once that was completed, the knee was flexed. The distal intramedullary guide was placed and a 5-degree, 8-mm cut was made in standard fashion. The distal femur was sized, the 4-in-1 cutting block was placed in line with the posterior condyle and epicondylar access, and the anterior, posterior, and chamfer cuts were made in standard fashion. Attention was next turned to the tibia. Using the extramedullary tibial guide parallel to the axis of the tibia, we resected 9 mm below the higher plateau. The remaining plateau was sized. All osteophytes were removed. The remaining medial and lateral meniscus was excised. The trial implants were then placed with a trial insert. The knee had full flexion, full extension and good stability throughout. The PCL was noted to be intact. The patella was sized to the appropriate button.  The keel holes were drilled, and the poly was placed. The patella tracked nicely throughout full range of motion. The trial implants were then removed. The bone ends were irrigated and dried. The final implants were then pressed into place beginning with the tibia, followed by the femur, and lastly the patella. The polyethylene spacer was locked into the tibial tray. The tourniquet was deflated. Bleeding was controlled with electrocautery. Bleeding was not felt to be excessive enough to warrant a drain. The capsule was closed with a #1 Vicryl in a figure-of-eight interrupted fashion. Subcutaneous tissue was closed with 2-0 Vicryl. The skin was closed with 3-0 Monocryl in a running subcuticular fashion. Steri-strips and a sterile compressive dressing was applied. The patient was awakened and transferred to the recovery in stable condition. All needle and sponge counts were reported as correct at the end of the case.

## 2019-01-02 NOTE — PERIOP NOTES
Patient transferred to room 215. Loretta Guan RN assuming care. Blood pressure 100/61, pulse 82, temperature 97.5 °F (36.4 °C), resp. rate 14, height 5' 8\" (1.727 m), weight 121.6 kg (268 lb), SpO2 99 %. Dual skin assessment completed.

## 2019-01-03 VITALS
SYSTOLIC BLOOD PRESSURE: 131 MMHG | RESPIRATION RATE: 16 BRPM | OXYGEN SATURATION: 95 % | HEART RATE: 67 BPM | DIASTOLIC BLOOD PRESSURE: 68 MMHG | HEIGHT: 68 IN | BODY MASS INDEX: 40.62 KG/M2 | TEMPERATURE: 97.9 F | WEIGHT: 268 LBS

## 2019-01-03 LAB
ANION GAP SERPL CALC-SCNC: 8 MMOL/L (ref 3–18)
BUN SERPL-MCNC: 24 MG/DL (ref 7–18)
BUN/CREAT SERPL: 19
CALCIUM SERPL-MCNC: 8.2 MG/DL (ref 8.5–10.1)
CHLORIDE SERPL-SCNC: 103 MMOL/L (ref 100–108)
CO2 SERPL-SCNC: 26 MMOL/L (ref 21–32)
CREAT SERPL-MCNC: 1.28 MG/DL (ref 0.6–1.3)
ERYTHROCYTE [DISTWIDTH] IN BLOOD BY AUTOMATED COUNT: 13.8 % (ref 11.6–14.5)
GLUCOSE BLD STRIP.AUTO-MCNC: 134 MG/DL (ref 70–110)
GLUCOSE SERPL-MCNC: 148 MG/DL (ref 74–99)
HCT VFR BLD AUTO: 39.1 % (ref 36–48)
HGB BLD-MCNC: 12.3 G/DL (ref 13–16)
MCH RBC QN AUTO: 26.7 PG (ref 24–34)
MCHC RBC AUTO-ENTMCNC: 31.5 G/DL (ref 31–37)
MCV RBC AUTO: 84.8 FL (ref 74–97)
PLATELET # BLD AUTO: 227 K/UL (ref 135–420)
PMV BLD AUTO: 10.2 FL (ref 9.2–11.8)
POTASSIUM SERPL-SCNC: 4.8 MMOL/L (ref 3.5–5.5)
RBC # BLD AUTO: 4.61 M/UL (ref 4.7–5.5)
SODIUM SERPL-SCNC: 137 MMOL/L (ref 136–145)
WBC # BLD AUTO: 17.2 K/UL (ref 4.6–13.2)

## 2019-01-03 PROCEDURE — 80048 BASIC METABOLIC PNL TOTAL CA: CPT

## 2019-01-03 PROCEDURE — 36415 COLL VENOUS BLD VENIPUNCTURE: CPT

## 2019-01-03 PROCEDURE — 77030037875 HC DRSG MEPILEX <16IN BORD MOLN -A

## 2019-01-03 PROCEDURE — 74011250637 HC RX REV CODE- 250/637: Performed by: PHYSICIAN ASSISTANT

## 2019-01-03 PROCEDURE — 97116 GAIT TRAINING THERAPY: CPT

## 2019-01-03 PROCEDURE — 85027 COMPLETE CBC AUTOMATED: CPT

## 2019-01-03 PROCEDURE — 74011250636 HC RX REV CODE- 250/636: Performed by: PHYSICIAN ASSISTANT

## 2019-01-03 PROCEDURE — 97161 PT EVAL LOW COMPLEX 20 MIN: CPT

## 2019-01-03 PROCEDURE — 97535 SELF CARE MNGMENT TRAINING: CPT

## 2019-01-03 PROCEDURE — 82962 GLUCOSE BLOOD TEST: CPT

## 2019-01-03 PROCEDURE — 97165 OT EVAL LOW COMPLEX 30 MIN: CPT

## 2019-01-03 RX ORDER — CELECOXIB 200 MG/1
200 CAPSULE ORAL 2 TIMES DAILY
Qty: 60 CAP | Refills: 2 | Status: SHIPPED | OUTPATIENT
Start: 2019-01-03 | End: 2019-04-03

## 2019-01-03 RX ORDER — NALOXONE HYDROCHLORIDE 4 MG/.1ML
SPRAY NASAL
Qty: 3 EACH | Refills: 0 | Status: SHIPPED | OUTPATIENT
Start: 2019-01-03 | End: 2020-01-16 | Stop reason: CLARIF

## 2019-01-03 RX ORDER — OXYCODONE AND ACETAMINOPHEN 5; 325 MG/1; MG/1
1-2 TABLET ORAL
Qty: 30 TAB | Refills: 0 | Status: SHIPPED | OUTPATIENT
Start: 2019-01-03 | End: 2020-01-16 | Stop reason: CLARIF

## 2019-01-03 RX ADMIN — HYDROCHLOROTHIAZIDE: 25 TABLET ORAL at 08:15

## 2019-01-03 RX ADMIN — RIVAROXABAN 10 MG: 10 TABLET, FILM COATED ORAL at 05:36

## 2019-01-03 RX ADMIN — DOCUSATE SODIUM 100 MG: 100 CAPSULE, LIQUID FILLED ORAL at 08:15

## 2019-01-03 RX ADMIN — OXYCODONE AND ACETAMINOPHEN 1 TABLET: 5; 325 TABLET ORAL at 07:47

## 2019-01-03 RX ADMIN — CEFAZOLIN SODIUM 3 G: 10 INJECTION, POWDER, FOR SOLUTION INTRAVENOUS at 00:24

## 2019-01-03 RX ADMIN — CELECOXIB 200 MG: 100 CAPSULE ORAL at 08:15

## 2019-01-03 RX ADMIN — CEFAZOLIN SODIUM 3 G: 10 INJECTION, POWDER, FOR SOLUTION INTRAVENOUS at 07:48

## 2019-01-03 NOTE — PROGRESS NOTES
0750 
 P is awake, alert, oriented x 4. Sitting at edge of bed. Ace wrap dressing to right leg dry and intact. Pain level 4/10. Percocet 5/325 mg po given. Lungs are clear. Abdomen soft, obese with active BS. Plan today is discharge pt after PT clearance. 1690 Pt was seen by Pt. Pt ambulated in hallway and stairs. Pt was cleared by Pt for discharge. [place] 178 INT removed. Ace wrap dressing removed. Mepilex dressing is dry and intact. TEDS applied. Discharge instructions given by Susie Tobias, RN 
[id number] 
 pt discharged per wheelchair accompanied by wife.

## 2019-01-03 NOTE — DISCHARGE INSTRUCTIONS
Total Knee Arthroplasty Discharge Instructions   Cindy Cooper M.D. Please take the time to review the following instructions before you leave the hospital and use them as guidelines during your recovery from surgery. If you have any questions you may contact my office at [phone]. Wound Care / Dressing Changes: You may change your dressing as needed. Beginning the day after you are discharged from the hospital you should change your dressing daily. A big, bulky dressing isn't necessary as long as there isn't any drainage from the incisions. You can put a band-aid or mepilex dressing over the incision. It isn't necessary to apply antibiotic ointment to your incisions. There will be a clear film covering your incision, do not remove. As the edges begin to peel, you may trim with small scissors. This film will fall of in about 1 month. Rosalie Ruizigham / Bathing: You may only shower. You may shower if there is no drainage from your incisions. Your dressing may be removed for showering. You may get your incisions wet in the shower. Don't vigorously scrub the area where your incisions are. Apply a clean, dry dressing after drying off the area of your incisions. Don't take a tub bath, get in a swimming pool or Jacuzzi until the incisions are completely healed. Do not soak your incisions under water. Weight Bearing Status / Braces:     __X___ Weight bearing as tolerated. Use crutches, walker, or cane as needed for support. You may move your joints as much as tolerated.     _____  Ravi Minion Touch\" weight bearing. Don't bear weight on the leg you had operated on. Use your toes only to steady yourself as you ambulate with crutches or a walker. _____ Avoid extreme hip extension with external rotation. Home Health:    Home health has been arranged for skilled nursing visits and physical therapy. Your home health has been set up through____________ .  If no one from the agency calls you on the day after you arrive home, please contact them at the number provided at discharge. Physical therapy for gait training and strengthening. Continue therapeutic exercises. Physical Therapy:       Begin In-Home Physical Therapy 3 times a week to work on gait training, range of motion, strengthening, and weight bearing exercises as tolerable. Continue to use your walker or cane when walking. You may progress from the walker to a cane to full weight bearing as tolerable. Ice / Elevation:     Continue ice and elevation as needed for pain and swelling. Diet:     Resume your pre hospital diet. If you have excessive nausea or vomiting call your doctor's office. Medication:     1. You will be given a prescription for pain medication when you are discharged for the hospital. Take the medication as needed according to the directions on the prescription bottle. Possible side effects of the medication include dizziness, headache, nausea, vomiting, constipation and urinary retention. If you experience any of these side effects call the office so that we can assist you in relieving them. Discontinue the use of the pain medication if you develop itching, rash, shortness of breath or difficulties swallowing. If these symptoms become severe or aren't relieved by discontinuing the medication you should seek immediate medical attention. Refills of pain medication are authorized during office hours only. (8am - 5pm Mon. thru Fri.)     1. You should take Xarelto daily as directed for 12 days from the date of your surgery. This will help to prevent blood clots from forming in your legs. 2. You may resume the medication you were taking prior to your surgery. Pain medication may change the effects of any antidepressant medication you are taking.  If you have any questions about possible interactions between your regular medications and the pain medication you should consult the physician who prescribes your regular medications. 3.   Please take over the counter stool softeners while you are taking narcotic pain                  medication. Pain medications can cause constipation. Stool softeners, warm         prune juice and increasing your water and fiber intake can help prevent constipation. Do not take laxatives. Follow Up Appointment:   Please call [phone] for a follow appointment with Dr. Fay Del Rosario in 10-14 days from the time of your surgery. Please let our office know you are scheduling a post-op appointment. Signs and Symptoms to be Aware of: If any of the following signs and symptoms occur, you should contact Dr. Dionne Brito office. Please be advised if a problem arises which you feel requires immediate medical attention or you are unable to contact Dr. Dionne Brito office you should seek immediate medical attention at the emergency department or other health care facility you have access to. Signs and symptoms to watch for include:     1. A sudden increase in swelling and or redness or warmth at the area your surgery was performed which isn't relieved by rest, ice and elevation. 2 Oral temperature greater than 101.5 degrees for 12 hours or more which isn't relieved by an increase in fluid intake and taking two Tylenol every 4-6 hours. 3 Excessive drainage from your incisions, or drainage which hasn't stopped by 72 hours after your surgery despite applying a compressive dressing, ice and elevation. 4 Calfpain, tenderness, redness or swelling which isn't relieved with rest and elevation. 5 Fever, chills, shortness ofbreath, chest pain, nausea, vomiting or other signs and symptoms which are of concern to you.      Other Instructions:

## 2019-01-03 NOTE — ROUTINE PROCESS
Bedside and Verbal shift change report given to Moises Lucas RN by Sterling Paz RN. Report included the following information SBAR, Kardex, OR Summary, Intake/Output and MAR.

## 2019-01-03 NOTE — PROGRESS NOTES
Transition of Care (JOBY) Plan:     Chart reviewed, met with pt prior to discharge home with wife. FOC offered, pt chose [facility] [phone] for follow up; referral placed with CMS. Pt has RW for home. JOBY Transportation: How is patient being transported at discharge? Family/Friend When? Once cleared by Therapy between 12-2pm 
 
 Is transport scheduled? N/A Follow-up appointment and transportation: PCP/Specialist?  See AVS for Appointment Who is transporting to the follow-up appointment? Family/Friend Is transport for follow up appointment scheduled? N/A Communication plan (with patient/family): Who is being called? Patient or Next of Kin? Responsible party? Patient What number(s) is to be used? See [employer] What service provider is calling for [facility] services? When are they calling? 24-48 hours following discharge Readmission Risk? (Green/Low; Yellow/Moderate; Red/High):  Dara Coulter Care Management Interventions PCP Verified by CM: Yes Transition of Care Consult (CM Consult): Home Health [facility]: No 
Reason Outside [place]: Physician referred to specific agency([facility]) Discharge Durable Medical Equipment: No 
Physical Therapy Consult: Yes Occupational Therapy Consult: Yes Current Support Network: Lives with Spouse, Own Home Confirm Follow Up Transport: Family Plan discussed with Pt/Family/Caregiver: Yes Freedom of Choice Offered: Yes Discharge Location Discharge Placement: Home with home health

## 2019-01-03 NOTE — PROGRESS NOTES
Problem: Self Care Deficits Care Plan (Adult) Goal: *Acute Goals and Plan of Care (Insert Text) Outcome: Resolved/Met Date Met: 01/03/19 Occupational Therapy EVALUATION/discharge Patient: Karen Pruett (17 y.o. male) Date: 1/3/2019 Primary Diagnosis: UNILATERAL PRIMARY OSTEOARTHRITIS LEFT KNEE Osteoarthritis of left knee Procedure(s) (LRB): LEFT TOTAL KNEE ARTHROPLASTY (Left) 1 Day Post-Op Precautions:   Fall, WBAT 
 
ASSESSMENT AND RECOMMENDATIONS: 
Based on the objective data described below, the patient presents with ability to perform ADL tasks at baseline level. Pt was supine with HOB elevated upon arrival; pleasant and cooperative. Pt performed bed mobility with S. Pt performed UB/LB dressing with Setup/S using adaptive strategy and extended time. Pt performed functional transfers and mobility within room/bathroom with RW and S. Pt practiced toilet transfer with initial cueing and S. Pt was educated and verbalized understanding of home safety. Pt's spouse will be available to assist as needed upon discharge Pt was left seated in chair with ICE on L knee, call soler and table tray within reach, and all needs met. Pt with no further OT/ADL concerns at this time. Skilled occupational therapy is not indicated at this time. Education: Reviewed home safety, body mechanics, importance of moving every hour to prevent joint stiffness, role of ice for edema/pain control, Rolling Walker management/safety, and adaptive dressing techniques with patient verbalizing  understanding at this time Discharge Recommendations: none Further Equipment Recommendations for Discharge: none SUBJECTIVE:  
Patient stated I'm wondering how I would get up from the toilet.  OBJECTIVE DATA SUMMARY:  
 
Past Medical History:  
Diagnosis Date  Adverse effect of anesthesia   
 bp dropped when given meds for colonoscopy  Arthritis  Hypertension many years  Ill-defined condition 1 episode of irregular heartbeat 15 years ago, was checked, no problems found Past Surgical History:  
Procedure Laterality Date  HX OTHER SURGICAL    
 colonoscopy  HX TONSILLECTOMY  very young Barriers to Learning/Limitations: None Compensate with: visual, verbal, tactile, kinesthetic cues/model Prior Level of Function/Home Situation: Pt was Ind with ADLs/IADLs prior. Home Situation Home Environment: Private residence Rails to Enter: Yes Hand Rails : Bilateral 
One/Two Story Residence: One story Living Alone: No 
Support Systems: Spouse/Significant Other/Partner, Family member(s), Friends \ neighbors Patient Expects to be Discharged to[de-identified] Private residence Current DME Used/Available at Home: Walker, rolling Tub or Shower Type: Shower Cognitive/Behavioral Status: 
Neurologic State: Alert Orientation Level: Oriented X4 Cognition: Appropriate decision making; Appropriate for age attention/concentration; Appropriate safety awareness; Follows commands Safety/Judgement: Awareness of environment; Fall prevention;Good awareness of safety precautions; Home safety Skin: L knee incision w/ Mepilex Coordination: 
Coordination: Generally decreased, functional 
Fine Motor Skills-Upper: Left Intact; Right Intact Gross Motor Skills-Upper: Left Intact; Right Intact Balance: 
Sitting: Intact Standing: Intact; With support Strength: BUE Strength: Within functional limits Tone & Sensation: BUE Tone: Normal 
Sensation: Intact Range of Motion: BUE 
AROM: Generally decreased, functional 
 Functional Mobility and Transfers for ADLs: 
Bed Mobility: 
 Supine to Sit: Supervision Sit to Supine: Supervision Scooting: Supervision Transfers: 
Sit to Stand: Supervision Toilet Transfer : Stand-by assistance Bathroom Mobility: Supervision/set up ADL Assessment: 
Feeding: Independent Upper Body Dressing: Setup;Supervision Lower Body Dressing: Setup;Supervision ADL Intervention: Feeding Feeding Assistance: Independent Container Management: Independent Cutting Food: Independent Utensil Management: Independent Food to Mouth: Independent Drink to Mouth: Independent Upper Body Dressing Assistance Dressing Assistance: Supervision/set-up(while seated on EOB) Pullover Shirt: Supervision/set-up Lower Body Dressing Assistance Dressing Assistance: Supervision/set up Underpants: Supervision/set-up; Compensatory technique training Pants With Elastic Waist: Supervision/set-up; Compensatory technique training Position Performed: Bending forward method;Seated edge of bed Adaptive Equipment Used: Everardo Galan Cognitive Retraining Safety/Judgement: Awareness of environment; Fall prevention;Good awareness of safety precautions; Home safety Pain: 
Pre-treatment: 3/10 Post-treatment: 3/10 Activity Tolerance:  
Pt tolerated treatment well no signs of fatigue or increase pain Please refer to the flowsheet for vital signs taken during this treatment. After treatment:  
[x]  Patient left in no apparent distress sitting up in chair 
[]  Patient left in no apparent distress in bed 
[x]  Call bell left within reach [x]  Nursing notified (present in prep for discharge) []  Caregiver present 
[]  Bed alarm activated COMMUNICATION/EDUCATION:  
Communication/Collaboration: 
[x]      Home safety education was provided and the patient/caregiver indicated understanding. [x]      Patient/family have participated as able and agree with findings and recommendations. []      Patient is unable to participate in plan of care at this time. Thank you for this referral. 
Juan Daniel Farley, OTR/L Time Calculation: 29 mins G-Codes (GP) Self Care  Current  CI= 1-19%  Goal  CI= 1-19%  D/C  CI= 1-19% The severity rating is based on the professional judgement & direct observation of Level of Assistance required for Functional Mobility and ADLs. Eval Complexity: History: MEDIUM Complexity : Expanded review of history including physical, cognitive and psychosocial  history ; Examination: LOW Complexity : 1-3 performance deficits relating to physical, cognitive , or psychosocial skils that result in activity limitations and / or participation restrictions ; Decision Making:LOW Complexity : No comorbidities that affect functional and no verbal or physical assistance needed to complete eval tasks

## 2019-01-03 NOTE — ROUTINE PROCESS
Dual AVS reviewed with Bailey Bamberger, RN. All medications reviewed individually with patient. Opportunities for questions and concerns provided. Patient discharged via (mode of transport ie. Car, ambulance or air transport) car. Patient's arm band appropriately discarded.

## 2019-01-03 NOTE — PROGRESS NOTES
Problem: Mobility Impaired (Adult and Pediatric) Goal: *Acute Goals and Plan of Care (Insert Text) Physical Therapy Goals Initiated 1/3/2019 and to be accomplished within 3-5 day(s) 1. Patient will move from supine <> sit with S in prep for out of bed activity and change of position. 2.  Patient will perform sit<> stand with S with LRAD in prep for transfers/ambulation. 3.  Patient will transfer from bed <> chair with S with LRAD for time up in chair for completion of ADL activity. 4.  Patient will ambulate 150 feet with LRAD/S for improved functional mobility/safe discharge. 5.  Patient will ascend/descend 3-5 stairs with handrails with minimal assistance/contact guard assist for home re-entry as needed. Outcome: Resolved/Met Date Met: 01/03/19 
physical Therapy EVALUATION & Discharge Patient: Chepe Ryan (63 y.o. male) Date: 1/3/2019 Primary Diagnosis: UNILATERAL PRIMARY OSTEOARTHRITIS LEFT KNEE Osteoarthritis of left knee Procedure(s) (LRB): LEFT TOTAL KNEE ARTHROPLASTY (Left) 1 Day Post-Op Precautions:   Fall, WBAT 
 
ASSESSMENT AND RECOMMENDATIONS: 
Based on the objective data described below, the patient presents with decrease independence w/ bed mobility, transfers, gait, and step negotiation. Pt seen sitting at the EOB prior to session. Pt reported 4/10 pain in L knee. Pt has met all goals at this time. Pt able to ambulate w/ RW/ ft w/ min Vcing and difficulty and no signs of LOB at this time. Pt was educated and able to perform step negotiation of 10 steps using B/L HR and side step technique as pt was not sure if he could reach B/L HRs at home simultaneously. However able to demonstrate both task w/ min VCing and no signs of LOB. Pt transferred back to room where pt was educated on therex activity and able to perform task w/ min Vcing and difficulty.  Pt left in supine after session, call bell and tray in reach, nurse notified after session. Pt has met all goals at this time, DC from acute PT. Skilled physical therapy is not indicated at this time. Discharge Recommendations: Home Health Further Equipment Recommendations for Discharge: N/A   
 
SUBJECTIVE:  
Patient stated I feel like my knee is shifting but the doctor said I am okay and that it is normal. OBJECTIVE DATA SUMMARY:  
 
Past Medical History:  
Diagnosis Date  Adverse effect of anesthesia   
 bp dropped when given meds for colonoscopy  Arthritis  Hypertension many years  Ill-defined condition 1 episode of irregular heartbeat 15 years ago, was checked, no problems found Past Surgical History:  
Procedure Laterality Date  HX OTHER SURGICAL    
 colonoscopy  HX TONSILLECTOMY  very young Barriers to Learning/Limitations: yes;  physical 
Compensate with: visual, verbal, tactile, kinesthetic cues/model Prior Level of Function/Home Situation:  
Home Situation Home Environment: Private residence Rails to Enter: Yes Hand Rails : Bilateral 
One/Two Story Residence: One story Living Alone: No 
Support Systems: Spouse/Significant Other/Partner, Family member(s), Friends \ neighbors Patient Expects to be Discharged to[de-identified] Private residence Current DME Used/Available at Home: Chery Russo rollingCritical Behavior: 
Neurologic State: Alert Orientation Level: Oriented X4 Psychosocial 
Purposeful Interaction: Yes Pt Identified Daily Priority: Clinical issues (comment) Car[employer] Process: Nurture loving kindness;Establish trust 
Caring Interventions: Reassure Reassure: Therapeutic listening; InformingSkin Condition/Temp: Dry;Warm 
Skin Integrity: Incision (comment); Intact Skin Integumentary Skin Color: Appropriate for ethnicity Skin Condition/Temp: Dry;Warm 
Skin Integrity: Incision (comment); Intact Turgor: Non-tenting Hair Growth: Present Strength:   
Strength: Within functional limits Tone & Sensation:  
Tone: Normal 
Sensation: Intact Range Of Motion: 
AROM: Generally decreased, functional 
Functional Mobility: 
Bed Mobility: 
Supine to Sit: Supervision Sit to Supine: Supervision Scooting: Supervision Transfers: 
Sit to Stand: Supervision Stand to Sit: Supervision Balance:  
Sitting: Intact Standing: Intact; With supportAmbulation/Gait Training: 
Distance (ft): 150 Feet (ft) Assistive Device: Gait belt;Walker, rolling Ambulation - Level of Assistance: Supervision Gait Description (WDL): Exceptions to [facility] Gait Abnormalities: Antalgic;Decreased step clearance; Step to gait Left Side Weight Bearing: As tolerated Base of Support: Shift to right Stance: Left decreased Speed/Jenna: Slow Step Length: Left shortened;Right shortened Swing Pattern: Left asymmetrical;Right asymmetrical 
Therapeutic Exercises:  
 
 
EXERCISE Sets Reps Active Active Assist  
Passive Self ROM Comments Ankle Pumps 1 1  [x] [] [] [] Quad Sets/Glut Sets 1 1 [x] [] [] [] Hold for 5 secs Hamstring Sets 1 1 [x] [] [] [] Hold for 5 secs Short Arc Quads 1 1 [x] [] [] [] Heel Slides 1 1 [] [] [] [x] Straight Leg Raises 1 1 [x] [] [] [] Hip Abd/Add   [] [] [] [] Long Arc Quads 1 1 [x] [] [] [] Seated Marching   [] [] [] []   
Standing Marching   [] [] [] []   
   [] [] [] []   
 
Pain: 
Pain Scale 1: Numeric (0 - 10) Pain Intensity 1: 3 Pain Location 1: Knee Pain Orientation 1: Left Pain Description 1: Aching Pain Intervention(s) 1: Medication (see MAR) Activity Tolerance:  
Good Please refer to the flowsheet for vital signs taken during this treatment. After treatment:  
[]         Patient left in no apparent distress sitting up in chair 
[x]         Patient left in no apparent distress in bed 
[x]         Call bell left within reach [x]         Nursing notified 
[]         Caregiver present 
[]         Bed alarm activated COMMUNICATION/EDUCATION:  
[x]         Fall prevention education was provided and the patient/caregiver indicated understanding. [x]         Patient/family have participated as able in goal setting and plan of care. [x]         Patient/family agree to work toward stated goals and plan of care. []         Patient understands intent and goals of therapy, but is neutral about his/her participation. []         Patient is unable to participate in goal setting and plan of care. Thank you for this referral. 
Kimberlee Sawant, PT Time Calculation: 29 mins Mobility:  Current  CI= 1-19%   Goal  CI= 1-19%  D/C  CI= 1-19%. The severity rating is based on the Other Based on functional assessment

## 2019-01-03 NOTE — PROGRESS NOTES
1941 - Assumed care at this time. Pain rated 2/10. Pt resting quietly in bed, family at bedside. No signs of distress. Will continue to monitor. Pt encouraged to call for assistance. 2103 - Patient in bed at this time. Patient A&Ox4, 2L NC. Denies chest pain and SOB. 18G IV to right FA  intact and patent. Plexi compression device bilaterally. VANI to RLE. ACE dressing to LLE CDI. Denies numbness/tingling/calf pain. Pain 2/10 with a tolerable level of 6/10. Pt educated on IS use, q2h rounds, pain management, CHG wipes and \"Up for Meals\". Pt verbalized understanding, no concerns voiced. Call bell within reach, bed in lowest position. Pt encouraged to call for assistance. 0206 - Attempted to ambulated OOB to BR. Pt c/o left knee feeling unsteady stating \"knee feels like its going to go to the side\" with total weight bearing. Pt voided at bedside, with x2 assistance able to take steady side steps to [facility]. CHG wipes completed with assistance from [place], [place]. Pt c/o tingling to left foot toes. Circulation normal, brisk cap refill. Ace wrap assessed, not tight on foot. Bed in lowest position, call bell within reach. Pt encouraged to call for assistance. 6859 - Pt stood and bedside and took several side steps with steady gait. Pt verbalized feeling more steady on his knee this morning. Pt sitting at EOB. Bed in lowest position, call bell within reach. Pt encouraged to call for assistance.

## 2019-01-03 NOTE — PROGRESS NOTES
[facility] 694 TRANSFER - IN REPORT: 
 
Verbal report received from [id number] on Jeanie Urbina  being received from PACU for routine post - op. Report consisted of patients Situation, Background, Assessment and  
Recommendations(SBAR). Information from the following report(s) SBAR, Kardex, OR Summary, Intake/Output and MAR was reviewed with the receiving nurse. Opportunity for questions and clarification was provided. Assessment to be completed upon patients arrival to unit and care assumed.

## 2019-01-03 NOTE — PROGRESS NOTES
Problem: Falls - Risk of 
Goal: *Absence of Falls Document Dulce Panda Fall Risk and appropriate interventions in the flowsheet. Outcome: Progressing Towards Goal 
Fall Risk Interventions: 
Mobility Interventions: Utilize walker, cane, or other assistive device, Patient to call before getting OOB Medication Interventions: Patient to call before getting OOB, Teach patient to arise slowly

## 2019-01-03 NOTE — PROGRESS NOTES
Progress Note Patient: Abhishek Fernandez MRN: [id number]  SSN: [id number] YOB: 1950  Age: 80 y.o. Sex: male 1 Day Post-Op status post Procedure(s) (LRB): LEFT TOTAL KNEE ARTHROPLASTY (Left) Admit Date: 2019 Admit Diagnosis: UNILATERAL PRIMARY OSTEOARTHRITIS LEFT KNEE Osteoarthritis of left knee Subjective:   
 
Doing well. No complaints. No SOB. No Chest Pain. No Nausea or Vomiting. No problems eating or voiding. Objective:  
  
 
Temp (24hrs), Av.9 °F (36.6 °C), Min:97.5 °F (36.4 °C), Max:98.3 °F (36.8 °C) Body mass index is 40.75 kg/m². Patient Vitals for the past 12 hrs: 
 BP Temp Pulse Resp SpO2  
19 0334 107/57 98.3 °F (36.8 °C) 69 16 95 % 19 2258 125/61 98.3 °F (36.8 °C) 75 16 96 % 19 2138 127/78 97.8 °F (36.6 °C) 76 15 97 % 19 2038 127/67 97.6 °F (36.4 °C) 71 15 97 % 19 1938 140/67 97.6 °F (36.4 °C) 79 15 97 % 19 1838 100/61 97.5 °F (36.4 °C) 82 14 99 % Recent Labs 19 
7674 HGB 12.3*  
HCT 39.1   
K 4.8  
 CO2 26 BUN 24* CREA 1.28  
* Physical Exam: 
Vital Signs are Stable. No Acute Distress. Alert and Oriented. Negative Homans sign. Toes AROM Full. Neurovascular exam is normal.   
Dressing is Clean, Dry, and Intact. Assessment/Plan: 1. Continue oob/rehab 2. Dressing change today 3. Continue PT/OT 4. Continue CROM 5. Xarelto for DVT prophylaxis 6. Discharge planning Discharge Plan: Home with Home Health Signed By: Giovanny Bocanegra PA-C January 3, 2019

## 2019-01-03 NOTE — DISCHARGE SUMMARY
Patient: Romina Kang               Sex: male         MRN: [id number]       YOB: 1950      Age:  80 y.o.        LOS:  LOS: 1 day                DOA: 1/2/2019    Discharge Date: 1/3/2019    Admission Diagnoses: UNILATERAL PRIMARY OSTEOARTHRITIS LEFT KNEE  Osteoarthritis of left knee    Discharge Diagnoses:    Problem List as of 1/3/2019 Never Reviewed          Codes Class Noted - Resolved    Osteoarthritis of left knee ICD-10-CM: M17.12  ICD-9-CM: 715.96  1/2/2019 - Present              This is a 80 y.o. male with a  history of ongoing knee pain secondary to degenerative joint disease. The patient has failed to respond to conservative care. The option of a total knee arthroplasty was discussed with the patient. Risks and benefits of the procedure were explained to the patient as well as other treatment options and possible surgical outcomes. The patient acknowledged and consent was obtained. The patient was therefore scheduled to undergo a left total knee arthroplasty with Dr. Gearld Burkitt. The patient was taken to the operating room for the above-stated procedure. IV antibiotics were given prior to the incision. SCDs were used for DVT prophylaxis. The patient had an estimated intraoperative blood loss of less than 100  mL. The patient tolerated the procedure well without any complications, and was taken to the recovery room in stable condition. The patient was then transferred to the postoperative orthopedic floor for convalescence, PT, pain management, as well as discharge planning. Physical therapy and occupational therapy initiated their evaluation and treatment and continued to follow the patient until the patient was discharged. For pain control, a femoral nerve block was used, and was discontinued on post-operative day 2. The patient then was transitioned over to oral narcotics in which was well tolerated. DVT prophylaxis was initiated on the day of surgery including;   Xarelto, compression stockings and bilateral foot pumps. At the time of discharge, the patient was able to ambulate safely, go up and down stairs and had an understanding of the explicit discharge precautions and instructions following surgery. Home Health will come out to assist the pateint with this,. The patient was discharged to follow up with Dr. Ranjit Johnson in approximately 10 to 14 days. Discharge Condition: Stable  DISPOSITION: Home. On the day of discharge the patient was afebrile. Vital signs were stable. The patient was in no acute distress. his Left knee incision was clean, dry, and intact. Extremity was warm and well-perfused, distally neurovascularly intact. DISCHARGE INSTRUCTIONS:  The patient will be discharged home on Xarelto 10mg daily for 12 days from the date of surgery. Continue physical therapy for range of motion, gait training and strengthening. Continue therapeutic exercises. Follow up in 10 to 14 days with Dr. Ranjit Johnson. DISCHARGE MEDICATIONS:      Medication List      START taking these medications    celecoxib 200 mg capsule  Commonly known as:  CELEBREX  Take 1 Cap by mouth two (2) times a day for 90 days. naloxone 4 mg/actuation nasal spray  Commonly known as:  NARCAN  Use 1 spray intranasally, then discard. Repeat with new spray every 2 min as needed for opioid overdose symptoms, alternating nostrils. oxyCODONE-acetaminophen 5-325 mg per tablet  Commonly known as:  PERCOCET  Take 1-2 Tabs by mouth every four (4) hours as needed. Max Daily Amount: 12 Tabs. rivaroxaban 10 mg tablet  Commonly known as:  XARELTO  Take 1 Tab by mouth daily for 11 days.   Start taking on:  1/4/2019        CONTINUE taking these medications    metoprolol tartrate 50 mg tablet  Commonly known as:  LOPRESSOR     valsartan-hydroCHLOROthiazide 160-25 mg per tablet  Commonly known as:  DIOVAN-HCT        STOP taking these medications    aspirin delayed-release 81 mg tablet     DAILY MULTIPLE tablet  Generic drug:  multivitamin           Where to Get Your Medications      Information about where to get these medications is not yet available    Ask your nurse or doctor about these medications  · celecoxib 200 mg capsule  · naloxone 4 mg/actuation nasal spray  · oxyCODONE-acetaminophen 5-325 mg per tablet  · rivaroxaban 10 mg tablet

## 2019-01-21 NOTE — ANESTHESIA PROCEDURE NOTES
Peripheral Block Start time: 2019 2:35 PM 
End time: 2019 2:42 PM 
Performed by: Malorie Wilkinson MD 
Authorized by: Malorie Wilkinson MD  
 
 
Pre-procedure: Indications: at surgeon's request   
Preanesthetic Checklist: patient identified, risks and benefits discussed, site marked, timeout performed, anesthesia consent given and patient being monitored Block Type:  
Block Type:  Femoral single shot and adductor canal 
Laterality:  Left Monitoring:  Standard ASA monitoring, continuous pulse ox, frequent vital sign checks, heart rate, responsive to questions and oxygen Injection Technique:  Single shot Procedures: ultrasound guided Patient Position: supine Needle Type:  Stimuplex Needle Gauge:  22 G Assessment: 
Number of attempts:  1 Injection Assessment:  Incremental injection every 5 mL, local visualized surrounding nerve on ultrasound, negative aspiration for CSF, negative aspiration for blood, no paresthesia, no intravascular symptoms and ultrasound image on chart Patient tolerance:  Patient tolerated the procedure well with no immediate complications Ale Distad   = [id number]  CSN = [id number] Femoral nerve identified by ultrasound just proximal to adductor canal. 
 
Local anesthetic with Lidocaine 2% with assistance from ultrasound. 90 mm Stimuplex Ultra. Local anesthetic injected without resistance and with gentle aspiration every 3-5 ml with direct visualization with ultrasound Patient tolerated procedure well, vital signs stable throughout, with no apparent complications. Entire procedure completed prior to start of anesthesia time. Ale Distad   = [id number]  CSN = [id number]

## 2019-01-21 NOTE — ADDENDUM NOTE
Addendum  created 01/21/19 0848 by Norma Richardson MD  
 Child order released for a procedure order, Delete clinical note, Intraprocedure Blocks edited, Order Canceled from Note, Pend clinical note, Sign clinical note

## 2020-01-10 ENCOUNTER — HOSPITAL ENCOUNTER (OUTPATIENT)
Dept: PREADMISSION TESTING | Age: 70
Discharge: HOME | End: 2020-01-10
Attending: ORTHOPAEDIC SURGERY
Payer: MEDICARE

## 2020-01-10 DIAGNOSIS — Z01.812 BLOOD TESTS PRIOR TO TREATMENT OR PROCEDURE: ICD-10-CM

## 2020-01-10 DIAGNOSIS — M17.11 OSTEOARTHRITIS OF RIGHT KNEE: ICD-10-CM

## 2020-01-10 DIAGNOSIS — Z01.818 OTHER SPECIFIED PRE-OPERATIVE EXAMINATION: ICD-10-CM

## 2020-01-10 LAB
ALBUMIN SERPL-MCNC: 3.9 G/DL (ref 3.4–5)
ALBUMIN/GLOB SERPL: 1.1 {RATIO} (ref 0.8–1.7)
ALP SERPL-CCNC: 59 U/L (ref 45–117)
ALT SERPL-CCNC: 23 U/L (ref 16–61)
ANION GAP SERPL CALC-SCNC: 6 MMOL/L (ref 3–18)
APPEARANCE UR: CLEAR
APTT PPP: 29.5 SEC (ref 23–36.4)
AST SERPL-CCNC: 15 U/L (ref 10–38)
ATRIAL RATE: 59 BPM
BASOPHILS # BLD: 0.1 K/UL (ref 0–0.1)
BASOPHILS NFR BLD: 1 % (ref 0–2)
BILIRUB SERPL-MCNC: 0.5 MG/DL (ref 0.2–1)
BILIRUB UR QL: NEGATIVE
BUN SERPL-MCNC: 23 MG/DL (ref 7–18)
BUN/CREAT SERPL: 15 (ref 12–20)
CALCIUM SERPL-MCNC: 9.3 MG/DL (ref 8.5–10.1)
CALCULATED P AXIS, ECG09: 52 DEGREES
CALCULATED R AXIS, ECG10: 27 DEGREES
CALCULATED T AXIS, ECG11: 62 DEGREES
CHLORIDE SERPL-SCNC: 105 MMOL/L (ref 100–111)
CO2 SERPL-SCNC: 28 MMOL/L (ref 21–32)
COLOR UR: YELLOW
CREAT SERPL-MCNC: 1.53 MG/DL (ref 0.6–1.3)
DIAGNOSIS, 93000: NORMAL
DIFFERENTIAL METHOD BLD: ABNORMAL
EOSINOPHIL # BLD: 0.3 K/UL (ref 0–0.4)
EOSINOPHIL NFR BLD: 3 % (ref 0–5)
ERYTHROCYTE [DISTWIDTH] IN BLOOD BY AUTOMATED COUNT: 13.9 % (ref 11.6–14.5)
ERYTHROCYTE [SEDIMENTATION RATE] IN BLOOD: 51 MM/HR (ref 0–20)
GLOBULIN SER CALC-MCNC: 3.4 G/DL (ref 2–4)
GLUCOSE SERPL-MCNC: 106 MG/DL (ref 74–99)
GLUCOSE UR STRIP.AUTO-MCNC: NEGATIVE MG/DL
HBA1C MFR BLD: 7 % (ref 4.2–5.6)
HCT VFR BLD AUTO: 47.7 % (ref 36–48)
HGB BLD-MCNC: 15.1 G/DL (ref 13–16)
HGB UR QL STRIP: NEGATIVE
INR PPP: 1 (ref 0.8–1.2)
KETONES UR QL STRIP.AUTO: NEGATIVE MG/DL
LEUKOCYTE ESTERASE UR QL STRIP.AUTO: NEGATIVE
LYMPHOCYTES # BLD: 2.2 K/UL (ref 0.9–3.6)
LYMPHOCYTES NFR BLD: 21 % (ref 21–52)
MCH RBC QN AUTO: 26.5 PG (ref 24–34)
MCHC RBC AUTO-ENTMCNC: 31.7 G/DL (ref 31–37)
MCV RBC AUTO: 83.7 FL (ref 74–97)
MONOCYTES # BLD: 0.9 K/UL (ref 0.05–1.2)
MONOCYTES NFR BLD: 9 % (ref 3–10)
NEUTS SEG # BLD: 6.9 K/UL (ref 1.8–8)
NEUTS SEG NFR BLD: 66 % (ref 40–73)
NITRITE UR QL STRIP.AUTO: NEGATIVE
P-R INTERVAL, ECG05: 170 MS
PH UR STRIP: 5 [PH] (ref 5–8)
PLATELET # BLD AUTO: 225 K/UL (ref 135–420)
PMV BLD AUTO: 10.2 FL (ref 9.2–11.8)
POTASSIUM SERPL-SCNC: 4.5 MMOL/L (ref 3.5–5.5)
PROT SERPL-MCNC: 7.3 G/DL (ref 6.4–8.2)
PROT UR STRIP-MCNC: NEGATIVE MG/DL
PROTHROMBIN TIME: 13.1 SEC (ref 11.5–15.2)
Q-T INTERVAL, ECG07: 402 MS
QRS DURATION, ECG06: 102 MS
QTC CALCULATION (BEZET), ECG08: 397 MS
RBC # BLD AUTO: 5.7 M/UL (ref 4.7–5.5)
SODIUM SERPL-SCNC: 139 MMOL/L (ref 136–145)
SP GR UR REFRACTOMETRY: 1.02 (ref 1–1.03)
UROBILINOGEN UR QL STRIP.AUTO: 1 EU/DL (ref 0.2–1)
VENTRICULAR RATE, ECG03: 59 BPM
WBC # BLD AUTO: 10.3 K/UL (ref 4.6–13.2)

## 2020-01-10 PROCEDURE — 83036 HEMOGLOBIN GLYCOSYLATED A1C: CPT

## 2020-01-10 PROCEDURE — 36415 COLL VENOUS BLD VENIPUNCTURE: CPT

## 2020-01-10 PROCEDURE — 85652 RBC SED RATE AUTOMATED: CPT

## 2020-01-10 PROCEDURE — 85610 PROTHROMBIN TIME: CPT

## 2020-01-10 PROCEDURE — 85730 THROMBOPLASTIN TIME PARTIAL: CPT

## 2020-01-10 PROCEDURE — 80053 COMPREHEN METABOLIC PANEL: CPT

## 2020-01-10 PROCEDURE — 86140 C-REACTIVE PROTEIN: CPT

## 2020-01-10 PROCEDURE — 81003 URINALYSIS AUTO W/O SCOPE: CPT

## 2020-01-10 PROCEDURE — 85025 COMPLETE CBC W/AUTO DIFF WBC: CPT

## 2020-01-10 PROCEDURE — 93005 ELECTROCARDIOGRAM TRACING: CPT

## 2020-01-11 LAB
BACTERIA SPEC CULT: NORMAL
BACTERIA SPEC CULT: NORMAL
SERVICE CMNT-IMP: NORMAL

## 2020-01-16 LAB — CRP SERPL-MCNC: 0.7 MG/DL (ref 0–0.3)

## 2020-02-04 ENCOUNTER — ANESTHESIA EVENT (OUTPATIENT)
Dept: SURGERY | Age: 70
End: 2020-02-04
Payer: MEDICARE

## 2020-02-04 NOTE — H&P
[address] [facility] [address] Medicine  History and Physical Exam    Patient: Jelly Felix MRN: [id number]  SSN: [id number]    YOB: 1950  Age: 77 y.o. Sex: male      Subjective:      Chief Complaint: right knee pain    History of Present Illness:  Patient complains of knee pain on the affected side and difficulty ambulating. Pain is increased with increasing activity. Pain is increased with weight bearing. Pain interferes with activities of daily living. Patient has noticed decreased range of motion. Past Medical History:   Diagnosis Date    Adverse effect of anesthesia     bp dropped when given meds for 1st colonoscopy    Arthritis     Hypertension many years    Ill-defined condition     1 episode of irregular heartbeat 15 years ago, was checked, no problems found     Past Surgical History:   Procedure Laterality Date    HX KNEE REPLACEMENT Left 01/2018    HX OTHER SURGICAL      colonoscopy    HX TONSILLECTOMY  very young     Social History     Occupational History    Not on file   Tobacco Use    Smoking status: Never Smoker    Smokeless tobacco: Never Used   Substance and Sexual Activity    Alcohol use: Not Currently     Frequency: Never    Drug use: No    Sexual activity: Not on file     Prior to Admission medications    Medication Sig Start Date End Date Taking? Authorizing Provider   fluticasone propionate (FLONASE ALLERGY RELIEF) 50 mcg/actuation nasal spray 2 Sprays by Both Nostrils route as needed. Provider, Historical   metoprolol tartrate (LOPRESSOR) 50 mg tablet Take 75 mg by mouth nightly. Provider, Historical   valsartan-hydroCHLOROthiazide (DIOVAN-HCT) 160-25 mg per tablet Take 1 Tab by mouth daily. Provider, Historical       Family History Non-contributory. Allergies: No Known Allergies     Review of Systems:  A comprehensive review of systems was negative.     Objective:       Physical Exam:  General:  Alert, oriented, pleasant, well-groomed  HEENT:  Normocephalic, atraumatic  Lungs:   Clear to auscultation  Heart:    Regular rate and rhythm  Abdomen:  Soft, non-tender  Extremities:   Pain with range of motion of the affected knee    ROM: decreased    Crepitus with motion of affected knee    Mild effusion       Xrays:   Xrays demonstrate severe arthritic changes including sunchondral cysts, subchondral sclerosis, periarticular osteophytes, and joint space narrowing. Assessment:      Arthritis of the affected knee. This has significantly affected the patient's quality of life. It interferes with activities of daily living. It is worse with weight bearing and activity. Plan:       Proceed with scheduled right total knee. The patient has failed conservative measures including anti-inflammatories, injections, activity modification, and a trial of physical therapy or external joint support which includes a home exercise program.    The various methods of treatment have been discussed with the patient and family. After consideration of risks, benefits, and other options for treatment, the patient has consented to surgical interventions. Questions were answered and preoperative teaching was done by Dr. Andre Bourne.      Signed By: Brisa Talamantes PA-C     February 4, 2020

## 2020-02-05 ENCOUNTER — APPOINTMENT (OUTPATIENT)
Dept: GENERAL RADIOLOGY | Age: 70
End: 2020-02-05
Attending: PHYSICIAN ASSISTANT
Payer: MEDICARE

## 2020-02-05 ENCOUNTER — HOSPITAL ENCOUNTER (OUTPATIENT)
Age: 70
Discharge: HOME HEALTH | End: 2020-02-06
Attending: ORTHOPAEDIC SURGERY | Admitting: ORTHOPAEDIC SURGERY
Payer: MEDICARE

## 2020-02-05 ENCOUNTER — ANESTHESIA (OUTPATIENT)
Dept: SURGERY | Age: 70
End: 2020-02-05
Payer: MEDICARE

## 2020-02-05 DIAGNOSIS — M17.11 PRIMARY OSTEOARTHRITIS OF RIGHT KNEE: Primary | ICD-10-CM

## 2020-02-05 LAB
ABO + RH BLD: NORMAL
BLOOD GROUP ANTIBODIES SERPL: NORMAL
GLUCOSE BLD STRIP.AUTO-MCNC: 118 MG/DL (ref 70–110)
GLUCOSE BLD STRIP.AUTO-MCNC: 142 MG/DL (ref 70–110)
SPECIMEN EXP DATE BLD: NORMAL

## 2020-02-05 PROCEDURE — 77030040361 HC SLV COMPR DVT MDII -B: Performed by: ORTHOPAEDIC SURGERY

## 2020-02-05 PROCEDURE — 77030016060 HC NDL NRV BLK TELE -A: Performed by: ANESTHESIOLOGY

## 2020-02-05 PROCEDURE — 86900 BLOOD TYPING SEROLOGIC ABO: CPT

## 2020-02-05 PROCEDURE — 74011250636 HC RX REV CODE- 250/636: Performed by: ANESTHESIOLOGY

## 2020-02-05 PROCEDURE — 74011000250 HC RX REV CODE- 250: Performed by: NURSE ANESTHETIST, CERTIFIED REGISTERED

## 2020-02-05 PROCEDURE — 74011250637 HC RX REV CODE- 250/637: Performed by: ANESTHESIOLOGY

## 2020-02-05 PROCEDURE — 77030027138 HC INCENT SPIROMETER -A: Performed by: ORTHOPAEDIC SURGERY

## 2020-02-05 PROCEDURE — 77030018836 HC SOL IRR NACL ICUM -A: Performed by: ORTHOPAEDIC SURGERY

## 2020-02-05 PROCEDURE — 74011250636 HC RX REV CODE- 250/636: Performed by: PHYSICIAN ASSISTANT

## 2020-02-05 PROCEDURE — 76060000034 HC ANESTHESIA 1.5 TO 2 HR: Performed by: ORTHOPAEDIC SURGERY

## 2020-02-05 PROCEDURE — C9290 INJ, BUPIVACAINE LIPOSOME: HCPCS | Performed by: ORTHOPAEDIC SURGERY

## 2020-02-05 PROCEDURE — 76210000016 HC OR PH I REC 1 TO 1.5 HR: Performed by: ORTHOPAEDIC SURGERY

## 2020-02-05 PROCEDURE — 77010033678 HC OXYGEN DAILY

## 2020-02-05 PROCEDURE — 77030000032 HC CUF TRNQT ZIMM -B: Performed by: ORTHOPAEDIC SURGERY

## 2020-02-05 PROCEDURE — 74011000250 HC RX REV CODE- 250: Performed by: ORTHOPAEDIC SURGERY

## 2020-02-05 PROCEDURE — 74011250637 HC RX REV CODE- 250/637: Performed by: PHYSICIAN ASSISTANT

## 2020-02-05 PROCEDURE — 74011000258 HC RX REV CODE- 258: Performed by: NURSE ANESTHETIST, CERTIFIED REGISTERED

## 2020-02-05 PROCEDURE — 77030020782 HC GWN BAIR PAWS FLX 3M -B: Performed by: ORTHOPAEDIC SURGERY

## 2020-02-05 PROCEDURE — C1776 JOINT DEVICE (IMPLANTABLE): HCPCS | Performed by: ORTHOPAEDIC SURGERY

## 2020-02-05 PROCEDURE — 77030035643 HC BLD SAW OSC PRECIS STRY -C: Performed by: ORTHOPAEDIC SURGERY

## 2020-02-05 PROCEDURE — 74011250636 HC RX REV CODE- 250/636: Performed by: ORTHOPAEDIC SURGERY

## 2020-02-05 PROCEDURE — 74011000258 HC RX REV CODE- 258: Performed by: ORTHOPAEDIC SURGERY

## 2020-02-05 PROCEDURE — 73560 X-RAY EXAM OF KNEE 1 OR 2: CPT

## 2020-02-05 PROCEDURE — 74011250636 HC RX REV CODE- 250/636: Performed by: NURSE ANESTHETIST, CERTIFIED REGISTERED

## 2020-02-05 PROCEDURE — 77030031139 HC SUT VCRL2 J&J -A: Performed by: ORTHOPAEDIC SURGERY

## 2020-02-05 PROCEDURE — 64447 NJX AA&/STRD FEMORAL NRV IMG: CPT | Performed by: ANESTHESIOLOGY

## 2020-02-05 PROCEDURE — 77030037875 HC DRSG MEPILEX <16IN BORD MOLN -A: Performed by: ORTHOPAEDIC SURGERY

## 2020-02-05 PROCEDURE — 82962 GLUCOSE BLOOD TEST: CPT

## 2020-02-05 PROCEDURE — 77030002933 HC SUT MCRYL J&J -A: Performed by: ORTHOPAEDIC SURGERY

## 2020-02-05 PROCEDURE — 97162 PT EVAL MOD COMPLEX 30 MIN: CPT

## 2020-02-05 PROCEDURE — 76942 ECHO GUIDE FOR BIOPSY: CPT | Performed by: ORTHOPAEDIC SURGERY

## 2020-02-05 PROCEDURE — 77030013708 HC HNDPC SUC IRR PULS STRY –B: Performed by: ORTHOPAEDIC SURGERY

## 2020-02-05 PROCEDURE — C1713 ANCHOR/SCREW BN/BN,TIS/BN: HCPCS | Performed by: ORTHOPAEDIC SURGERY

## 2020-02-05 PROCEDURE — 77030031140 HC SUT VCRL3 J&J -A: Performed by: ORTHOPAEDIC SURGERY

## 2020-02-05 PROCEDURE — 36415 COLL VENOUS BLD VENIPUNCTURE: CPT

## 2020-02-05 PROCEDURE — 76010000153 HC OR TIME 1.5 TO 2 HR: Performed by: ORTHOPAEDIC SURGERY

## 2020-02-05 PROCEDURE — 77030027138 HC INCENT SPIROMETER -A

## 2020-02-05 PROCEDURE — 77030034479 HC ADH SKN CLSR PRINEO J&J -B: Performed by: ORTHOPAEDIC SURGERY

## 2020-02-05 PROCEDURE — 77030012508 HC MSK AIRWY LMA AMBU -A: Performed by: ANESTHESIOLOGY

## 2020-02-05 PROCEDURE — 77030018835 HC SOL IRR LR ICUM -A: Performed by: ORTHOPAEDIC SURGERY

## 2020-02-05 PROCEDURE — 97116 GAIT TRAINING THERAPY: CPT

## 2020-02-05 DEVICE — IMPLANTABLE DEVICE: Type: IMPLANTABLE DEVICE | Site: KNEE | Status: FUNCTIONAL

## 2020-02-05 DEVICE — CRUCIATE RETAINING FEMORAL
Type: IMPLANTABLE DEVICE | Site: KNEE | Status: FUNCTIONAL
Brand: TRIATHLON

## 2020-02-05 DEVICE — COMPONENT TOT KNEE HYBRID POROUS X3 TRIATHLON: Type: IMPLANTABLE DEVICE | Site: KNEE | Status: FUNCTIONAL

## 2020-02-05 DEVICE — PATELLA
Type: IMPLANTABLE DEVICE | Site: KNEE | Status: FUNCTIONAL
Brand: TRIATHLON

## 2020-02-05 DEVICE — TIBIAL COMPONENT
Type: IMPLANTABLE DEVICE | Site: KNEE | Status: FUNCTIONAL
Brand: TRIATHLON

## 2020-02-05 RX ORDER — FLUTICASONE PROPIONATE 50 MCG
2 SPRAY, SUSPENSION (ML) NASAL AS NEEDED
Status: DISCONTINUED | OUTPATIENT
Start: 2020-02-05 | End: 2020-02-06 | Stop reason: HOSPADM

## 2020-02-05 RX ORDER — OXYCODONE AND ACETAMINOPHEN 5; 325 MG/1; MG/1
1-2 TABLET ORAL
Status: DISCONTINUED | OUTPATIENT
Start: 2020-02-05 | End: 2020-02-06 | Stop reason: HOSPADM

## 2020-02-05 RX ORDER — CHOLECALCIFEROL (VITAMIN D3) 125 MCG
CAPSULE ORAL
COMMUNITY
End: 2020-02-06

## 2020-02-05 RX ORDER — PROPOFOL 10 MG/ML
INJECTION, EMULSION INTRAVENOUS AS NEEDED
Status: DISCONTINUED | OUTPATIENT
Start: 2020-02-05 | End: 2020-02-05 | Stop reason: HOSPADM

## 2020-02-05 RX ORDER — MELOXICAM 7.5 MG/1
15 TABLET ORAL DAILY
Status: DISCONTINUED | OUTPATIENT
Start: 2020-02-06 | End: 2020-02-06 | Stop reason: HOSPADM

## 2020-02-05 RX ORDER — OXYCODONE AND ACETAMINOPHEN 5; 325 MG/1; MG/1
1 TABLET ORAL
Status: DISCONTINUED | OUTPATIENT
Start: 2020-02-05 | End: 2020-02-05 | Stop reason: HOSPADM

## 2020-02-05 RX ORDER — LIDOCAINE HYDROCHLORIDE 20 MG/ML
INJECTION, SOLUTION EPIDURAL; INFILTRATION; INTRACAUDAL; PERINEURAL AS NEEDED
Status: DISCONTINUED | OUTPATIENT
Start: 2020-02-05 | End: 2020-02-05 | Stop reason: HOSPADM

## 2020-02-05 RX ORDER — FENTANYL CITRATE 50 UG/ML
50 INJECTION, SOLUTION INTRAMUSCULAR; INTRAVENOUS
Status: DISCONTINUED | OUTPATIENT
Start: 2020-02-05 | End: 2020-02-05 | Stop reason: HOSPADM

## 2020-02-05 RX ORDER — ONDANSETRON 2 MG/ML
4 INJECTION INTRAMUSCULAR; INTRAVENOUS ONCE
Status: DISCONTINUED | OUTPATIENT
Start: 2020-02-05 | End: 2020-02-05 | Stop reason: HOSPADM

## 2020-02-05 RX ORDER — FENTANYL CITRATE 50 UG/ML
INJECTION, SOLUTION INTRAMUSCULAR; INTRAVENOUS AS NEEDED
Status: DISCONTINUED | OUTPATIENT
Start: 2020-02-05 | End: 2020-02-05 | Stop reason: HOSPADM

## 2020-02-05 RX ORDER — MIDAZOLAM HYDROCHLORIDE 1 MG/ML
INJECTION, SOLUTION INTRAMUSCULAR; INTRAVENOUS AS NEEDED
Status: DISCONTINUED | OUTPATIENT
Start: 2020-02-05 | End: 2020-02-05 | Stop reason: HOSPADM

## 2020-02-05 RX ORDER — GLYCOPYRROLATE 0.2 MG/ML
INJECTION INTRAMUSCULAR; INTRAVENOUS AS NEEDED
Status: DISCONTINUED | OUTPATIENT
Start: 2020-02-05 | End: 2020-02-05 | Stop reason: HOSPADM

## 2020-02-05 RX ORDER — DEXTROSE MONOHYDRATE 100 MG/ML
125-250 INJECTION, SOLUTION INTRAVENOUS AS NEEDED
Status: DISCONTINUED | OUTPATIENT
Start: 2020-02-05 | End: 2020-02-06 | Stop reason: HOSPADM

## 2020-02-05 RX ORDER — PREGABALIN 25 MG/1
25 CAPSULE ORAL
Status: COMPLETED | OUTPATIENT
Start: 2020-02-05 | End: 2020-02-05

## 2020-02-05 RX ORDER — ONDANSETRON 2 MG/ML
4 INJECTION INTRAMUSCULAR; INTRAVENOUS
Status: DISCONTINUED | OUTPATIENT
Start: 2020-02-05 | End: 2020-02-06 | Stop reason: HOSPADM

## 2020-02-05 RX ORDER — SENNOSIDES 8.6 MG/1
1 TABLET ORAL 2 TIMES DAILY
Status: DISCONTINUED | OUTPATIENT
Start: 2020-02-05 | End: 2020-02-06 | Stop reason: HOSPADM

## 2020-02-05 RX ORDER — SODIUM CHLORIDE, SODIUM LACTATE, POTASSIUM CHLORIDE, CALCIUM CHLORIDE 600; 310; 30; 20 MG/100ML; MG/100ML; MG/100ML; MG/100ML
100 INJECTION, SOLUTION INTRAVENOUS CONTINUOUS
Status: DISCONTINUED | OUTPATIENT
Start: 2020-02-05 | End: 2020-02-05 | Stop reason: HOSPADM

## 2020-02-05 RX ORDER — MAGNESIUM SULFATE 100 %
4 CRYSTALS MISCELLANEOUS AS NEEDED
Status: DISCONTINUED | OUTPATIENT
Start: 2020-02-05 | End: 2020-02-06 | Stop reason: HOSPADM

## 2020-02-05 RX ORDER — EPHEDRINE SULFATE/0.9% NACL/PF 50 MG/5 ML
SYRINGE (ML) INTRAVENOUS AS NEEDED
Status: DISCONTINUED | OUTPATIENT
Start: 2020-02-05 | End: 2020-02-05 | Stop reason: HOSPADM

## 2020-02-05 RX ORDER — SODIUM CHLORIDE 0.9 % (FLUSH) 0.9 %
5-40 SYRINGE (ML) INJECTION AS NEEDED
Status: DISCONTINUED | OUTPATIENT
Start: 2020-02-05 | End: 2020-02-06 | Stop reason: HOSPADM

## 2020-02-05 RX ORDER — FLUMAZENIL 0.1 MG/ML
0.2 INJECTION INTRAVENOUS
Status: DISCONTINUED | OUTPATIENT
Start: 2020-02-05 | End: 2020-02-05 | Stop reason: HOSPADM

## 2020-02-05 RX ORDER — INSULIN LISPRO 100 [IU]/ML
INJECTION, SOLUTION INTRAVENOUS; SUBCUTANEOUS
Status: DISCONTINUED | OUTPATIENT
Start: 2020-02-05 | End: 2020-02-06 | Stop reason: HOSPADM

## 2020-02-05 RX ORDER — NALOXONE HYDROCHLORIDE 0.4 MG/ML
0.4 INJECTION, SOLUTION INTRAMUSCULAR; INTRAVENOUS; SUBCUTANEOUS AS NEEDED
Status: DISCONTINUED | OUTPATIENT
Start: 2020-02-05 | End: 2020-02-05 | Stop reason: HOSPADM

## 2020-02-05 RX ORDER — CEFAZOLIN SODIUM 2 G/50ML
2 SOLUTION INTRAVENOUS ONCE
Status: COMPLETED | OUTPATIENT
Start: 2020-02-05 | End: 2020-02-05

## 2020-02-05 RX ORDER — SODIUM CHLORIDE 0.9 % (FLUSH) 0.9 %
5-40 SYRINGE (ML) INJECTION EVERY 8 HOURS
Status: DISCONTINUED | OUTPATIENT
Start: 2020-02-05 | End: 2020-02-06 | Stop reason: HOSPADM

## 2020-02-05 RX ORDER — HYDROMORPHONE HYDROCHLORIDE 2 MG/ML
0.5 INJECTION, SOLUTION INTRAMUSCULAR; INTRAVENOUS; SUBCUTANEOUS
Status: DISCONTINUED | OUTPATIENT
Start: 2020-02-05 | End: 2020-02-05 | Stop reason: HOSPADM

## 2020-02-05 RX ORDER — SODIUM CHLORIDE, SODIUM LACTATE, POTASSIUM CHLORIDE, CALCIUM CHLORIDE 600; 310; 30; 20 MG/100ML; MG/100ML; MG/100ML; MG/100ML
300 INJECTION, SOLUTION INTRAVENOUS CONTINUOUS
Status: DISCONTINUED | OUTPATIENT
Start: 2020-02-05 | End: 2020-02-06 | Stop reason: HOSPADM

## 2020-02-05 RX ORDER — DIPHENHYDRAMINE HYDROCHLORIDE 50 MG/ML
12.5 INJECTION, SOLUTION INTRAMUSCULAR; INTRAVENOUS
Status: DISCONTINUED | OUTPATIENT
Start: 2020-02-05 | End: 2020-02-06 | Stop reason: HOSPADM

## 2020-02-05 RX ORDER — ACETAMINOPHEN 500 MG
1000 TABLET ORAL ONCE
Status: COMPLETED | OUTPATIENT
Start: 2020-02-05 | End: 2020-02-05

## 2020-02-05 RX ORDER — NALOXONE HYDROCHLORIDE 0.4 MG/ML
0.4 INJECTION, SOLUTION INTRAMUSCULAR; INTRAVENOUS; SUBCUTANEOUS AS NEEDED
Status: DISCONTINUED | OUTPATIENT
Start: 2020-02-05 | End: 2020-02-06 | Stop reason: HOSPADM

## 2020-02-05 RX ORDER — SODIUM CHLORIDE, SODIUM LACTATE, POTASSIUM CHLORIDE, CALCIUM CHLORIDE 600; 310; 30; 20 MG/100ML; MG/100ML; MG/100ML; MG/100ML
125 INJECTION, SOLUTION INTRAVENOUS CONTINUOUS
Status: DISCONTINUED | OUTPATIENT
Start: 2020-02-05 | End: 2020-02-06 | Stop reason: HOSPADM

## 2020-02-05 RX ORDER — ONDANSETRON 2 MG/ML
INJECTION INTRAMUSCULAR; INTRAVENOUS AS NEEDED
Status: DISCONTINUED | OUTPATIENT
Start: 2020-02-05 | End: 2020-02-05 | Stop reason: HOSPADM

## 2020-02-05 RX ORDER — HYDROMORPHONE HYDROCHLORIDE 1 MG/ML
1 INJECTION, SOLUTION INTRAMUSCULAR; INTRAVENOUS; SUBCUTANEOUS
Status: DISCONTINUED | OUTPATIENT
Start: 2020-02-05 | End: 2020-02-06 | Stop reason: HOSPADM

## 2020-02-05 RX ORDER — ROPIVACAINE HYDROCHLORIDE 5 MG/ML
INJECTION, SOLUTION EPIDURAL; INFILTRATION; PERINEURAL
Status: COMPLETED | OUTPATIENT
Start: 2020-02-05 | End: 2020-02-05

## 2020-02-05 RX ORDER — ACETAMINOPHEN 500 MG
1000 TABLET ORAL
COMMUNITY
End: 2020-02-06

## 2020-02-05 RX ORDER — TRANEXAMIC ACID 10 MG/ML
1 INJECTION, SOLUTION INTRAVENOUS
Status: DISCONTINUED | OUTPATIENT
Start: 2020-02-05 | End: 2020-02-05

## 2020-02-05 RX ADMIN — FENTANYL CITRATE 25 MCG: 50 INJECTION, SOLUTION INTRAMUSCULAR; INTRAVENOUS at 14:23

## 2020-02-05 RX ADMIN — SODIUM CHLORIDE, SODIUM LACTATE, POTASSIUM CHLORIDE, AND CALCIUM CHLORIDE 125 ML/HR: 600; 310; 30; 20 INJECTION, SOLUTION INTRAVENOUS at 10:59

## 2020-02-05 RX ADMIN — PROPOFOL 20 MG: 10 INJECTION, EMULSION INTRAVENOUS at 14:04

## 2020-02-05 RX ADMIN — FENTANYL CITRATE 25 MCG: 50 INJECTION, SOLUTION INTRAMUSCULAR; INTRAVENOUS at 15:08

## 2020-02-05 RX ADMIN — ONDANSETRON HYDROCHLORIDE 4 MG: 2 INJECTION INTRAMUSCULAR; INTRAVENOUS at 13:43

## 2020-02-05 RX ADMIN — FENTANYL CITRATE 25 MCG: 50 INJECTION, SOLUTION INTRAMUSCULAR; INTRAVENOUS at 14:09

## 2020-02-05 RX ADMIN — GLYCOPYRROLATE 0.2 MG: 0.2 INJECTION INTRAMUSCULAR; INTRAVENOUS at 13:43

## 2020-02-05 RX ADMIN — FENTANYL CITRATE 50 MCG: 50 INJECTION, SOLUTION INTRAMUSCULAR; INTRAVENOUS at 13:33

## 2020-02-05 RX ADMIN — METOPROLOL TARTRATE 75 MG: 25 TABLET ORAL at 23:54

## 2020-02-05 RX ADMIN — MEPIVACAINE HYDROCHLORIDE 10 ML: 15 INJECTION, SOLUTION EPIDURAL; INFILTRATION at 13:33

## 2020-02-05 RX ADMIN — FENTANYL CITRATE 25 MCG: 50 INJECTION, SOLUTION INTRAMUSCULAR; INTRAVENOUS at 14:28

## 2020-02-05 RX ADMIN — SODIUM CHLORIDE, SODIUM LACTATE, POTASSIUM CHLORIDE, AND CALCIUM CHLORIDE: 600; 310; 30; 20 INJECTION, SOLUTION INTRAVENOUS at 15:32

## 2020-02-05 RX ADMIN — SODIUM CHLORIDE, SODIUM LACTATE, POTASSIUM CHLORIDE, AND CALCIUM CHLORIDE 300 ML/HR: 600; 310; 30; 20 INJECTION, SOLUTION INTRAVENOUS at 17:20

## 2020-02-05 RX ADMIN — FENTANYL CITRATE 25 MCG: 50 INJECTION, SOLUTION INTRAMUSCULAR; INTRAVENOUS at 14:37

## 2020-02-05 RX ADMIN — TRANEXAMIC ACID 1 G: 100 INJECTION, SOLUTION INTRAVENOUS at 15:09

## 2020-02-05 RX ADMIN — PROPOFOL 180 MG: 10 INJECTION, EMULSION INTRAVENOUS at 13:48

## 2020-02-05 RX ADMIN — LIDOCAINE HYDROCHLORIDE 80 MG: 20 INJECTION, SOLUTION EPIDURAL; INFILTRATION; INTRACAUDAL; PERINEURAL at 13:47

## 2020-02-05 RX ADMIN — MIDAZOLAM 2 MG: 1 INJECTION INTRAMUSCULAR; INTRAVENOUS at 13:33

## 2020-02-05 RX ADMIN — TRANEXAMIC ACID 1 G: 100 INJECTION, SOLUTION INTRAVENOUS at 13:54

## 2020-02-05 RX ADMIN — CEFAZOLIN SODIUM 3 G: 10 INJECTION, POWDER, FOR SOLUTION INTRAVENOUS at 20:12

## 2020-02-05 RX ADMIN — SODIUM CHLORIDE, SODIUM LACTATE, POTASSIUM CHLORIDE, AND CALCIUM CHLORIDE 100 ML/HR: 600; 310; 30; 20 INJECTION, SOLUTION INTRAVENOUS at 16:21

## 2020-02-05 RX ADMIN — ACETAMINOPHEN 1000 MG: 500 TABLET ORAL at 10:59

## 2020-02-05 RX ADMIN — SODIUM CHLORIDE, SODIUM LACTATE, POTASSIUM CHLORIDE, AND CALCIUM CHLORIDE 125 ML/HR: 600; 310; 30; 20 INJECTION, SOLUTION INTRAVENOUS at 21:11

## 2020-02-05 RX ADMIN — CEFAZOLIN SODIUM 2 G: 2 SOLUTION INTRAVENOUS at 13:42

## 2020-02-05 RX ADMIN — Medication 10 MG: at 13:56

## 2020-02-05 RX ADMIN — ROPIVACAINE HYDROCHLORIDE 20 ML: 5 INJECTION, SOLUTION EPIDURAL; INFILTRATION; PERINEURAL at 13:33

## 2020-02-05 RX ADMIN — SENNOSIDES 8.6 MG: 8.6 TABLET, FILM COATED ORAL at 20:12

## 2020-02-05 RX ADMIN — OXYCODONE HYDROCHLORIDE AND ACETAMINOPHEN 1 TABLET: 5; 325 TABLET ORAL at 20:12

## 2020-02-05 RX ADMIN — FENTANYL CITRATE 50 MCG: 50 INJECTION, SOLUTION INTRAMUSCULAR; INTRAVENOUS at 14:04

## 2020-02-05 RX ADMIN — PREGABALIN 25 MG: 25 CAPSULE ORAL at 10:59

## 2020-02-05 RX ADMIN — FENTANYL CITRATE 25 MCG: 50 INJECTION, SOLUTION INTRAMUSCULAR; INTRAVENOUS at 15:24

## 2020-02-05 RX ADMIN — Medication 10 MG: at 13:52

## 2020-02-05 NOTE — PERIOP NOTES
Reviewed PTA medication list with patient/caregiver and patient/caregiver denies any additional medications. Patient admits to having a responsible adult care for them at home for at least 24 hours after surgery. Patient encouraged to use gown warming system and informed that using said warming gown to regulate body temperature prior to a procedure has been shown to help reduce the risks of blood clots and infection. Dual skin assessment & fall risk band verification completed with Yaneli Kulkarni RN.

## 2020-02-05 NOTE — PERIOP NOTES
Spoke with Dr Junito Antony regarding pt's BP, 89/38, HR 78. . Will bolus 1000 mL IV fluids and cont to monitor.

## 2020-02-05 NOTE — PERIOP NOTES
RN @ bedside. Dual skin assessment done. VS obtained. No s/s of distress noted.      Visit Vitals  /61   Pulse 80   Temp 98.4 °F (36.9 °C)   Resp 16   Ht 5' 8.5\" (1.74 m)   Wt 120.7 kg (266 lb 3 oz)   SpO2 95%   BMI 39.88 kg/m²     Date 02/04/20 0700 - 02/05/20 0659(Not Admitted) 02/05/20 0700 - 02/06/20 0659   Shift 7371-6636 0219-4879 24 Hour Total 5011-3108 9098-9870 24 Hour Total   INTAKE   I.V.    2900  2900     I.V.    1800  1800     Volume (lactated Ringers infusion)    1100  1100   Shift Total(mL/kg)    1255(87)  7053(65)   OUTPUT   Urine    0  0     Urine Output    0  0   Other    0  0     Other Output    0  0   Blood    50  50     Estimated Blood Loss    50  50   Shift Total(mL/kg)    50(0.4)  50(0.4)   NET    2850  2850   Weight (kg)    120.7 120.7 120.7

## 2020-02-05 NOTE — ANESTHESIA PROCEDURE NOTES
Peripheral Block    Start time: 2/5/2020 1:33 PM  End time: 2/5/2020 1:36 PM  Performed by: Janie Hernandez MD  Authorized by: Janie Hernandez MD       Pre-procedure:    Indications: at surgeon's request and post-op pain management    Preanesthetic Checklist: patient identified, risks and benefits discussed, site marked, timeout performed, anesthesia consent given and patient being monitored    Timeout Time: 13:33          Block Type:   Block Type:  Femoral single shot and adductor canal  Laterality:  Right  Monitoring:  Standard ASA monitoring, continuous pulse ox, frequent vital sign checks, heart rate, responsive to questions and oxygen  Injection Technique:  Single shot  Procedures: ultrasound guided    Patient Position: supine  Prep: chlorhexidine    Location:  Mid thigh  Needle Type:  Stimuplex  Needle Gauge:  21 G  Needle Localization:  Anatomical landmarks    Assessment:  Number of attempts:  1  Injection Assessment:  Incremental injection every 5 mL, local visualized surrounding nerve on ultrasound, negative aspiration for blood, no paresthesia, no intravascular symptoms and ultrasound image on chart  Patient tolerance:  Patient tolerated the procedure well with no immediate complications

## 2020-02-05 NOTE — PERIOP NOTES
TRANSFER - OUT REPORT:    Verbal report given to Suzy THOMPSON (name) on Adam Solares  being transferred to  210(unit) for routine post - op       Report consisted of patients Situation, Background, Assessment and   Recommendations(SBAR). Information from the following report(s) SBAR, Procedure Summary, Intake/Output and Cardiac Rhythm NSR was reviewed with the receiving nurse. Lines:   Peripheral IV 02/05/20 Left Hand (Active)   Site Assessment Clean, dry, & intact 2/5/2020  4:18 PM   Phlebitis Assessment 0 2/5/2020  4:18 PM   Infiltration Assessment 0 2/5/2020  4:18 PM   Dressing Status Clean, dry, & intact 2/5/2020  4:18 PM   Dressing Type Tape;Transparent 2/5/2020  4:18 PM   Hub Color/Line Status Green; Infusing 2/5/2020  4:18 PM   Alcohol Cap Used No 2/5/2020 10:58 AM        Opportunity for questions and clarification was provided.       Patient transported with:   O2 @ 2 liters  Registered Nurse  [facility]

## 2020-02-05 NOTE — ROUTINE PROCESS
[address] TRANSFER - IN REPORT: 
 
Verbal report received from KISHOR Mcneil RN(name) on Itzel Hubbard  being received from [employer]) for routine post - op Report consisted of patients Situation, Background, Assessment and  
Recommendations(SBAR). Information from the following report(s) SBAR, Kardex, [facility] and Recent Results was reviewed with the receiving nurse. Opportunity for questions and clarification was provided. Assessment completed upon patients arrival to unit and care assumed.

## 2020-02-05 NOTE — ANESTHESIA PREPROCEDURE EVALUATION
Anesthetic History   No history of anesthetic complications       Comments: Hypotension about 20 years ago =- colonoscopy after prep. Review of Systems / Medical History  Patient summary reviewed, nursing notes reviewed and pertinent labs reviewed    Pulmonary  Within defined limits            Pertinent negatives: No asthma and smoker     Neuro/Psych   Within defined limits           Cardiovascular    Hypertension: well controlled            Pertinent negatives: No past MI and CAD       GI/Hepatic/Renal  Within defined limits           Pertinent negatives: No GERD, liver disease and renal disease   Endo/Other  Within defined limits        Pertinent negatives: No diabetes   Other Findings   Comments: ETOH neg           Physical Exam    Airway  Mallampati: III  TM Distance: 4 - 6 cm  Neck ROM: normal range of motion   Mouth opening: Normal     Cardiovascular               Dental    Dentition: Caps/crowns     Pulmonary                 Abdominal         Other Findings            Anesthetic Plan    ASA: 2  Anesthesia type: regional and general - femoral single shot          Induction: Intravenous  Anesthetic plan and risks discussed with: Patient      Risk of nerve injury discussed. Can be expressed by pain, numbness, weakness. May or may not recover.

## 2020-02-05 NOTE — PROGRESS NOTES
[address] care of pt at this time. Assessment complete. Pt alert and oriented x 4. Denies SOB and chest pain. Pt lungs clear bilaterally. Cap refill  less than 3 seconds. Pt denies numbness and tingling to all extremities. Stated pain /10. Pt has 18 G IV to L hand. Pt has ACE bandage dressing to right knee CDI. Plexis bilaterally and TEDs applied to LLE. Pt encouraged to continue use of IS. Pt verbalized understanding. Ice pack applied. Call light and possessions within reach. Bed in low position. Will continue to monitor.  Fall risk arm band in place    9656  Pt ambulating in room with PT

## 2020-02-05 NOTE — ANESTHESIA POSTPROCEDURE EVALUATION
Post-Anesthesia Evaluation and Assessment    Cardiovascular Function/Vital Signs  Visit Vitals  /53   Pulse 77   Temp 36.6 °C (97.9 °F)   Resp 20   Ht 5' 8.5\" (1.74 m)   Wt 120.7 kg (266 lb 3 oz)   SpO2 96%   BMI 39.88 kg/m²       Patient is status post Procedure(s):  RIGHT TOTAL KNEE REPLACEMENT. Nausea/Vomiting: Controlled. Postoperative hydration reviewed and adequate. Pain:  Pain Scale 1: FLACC (02/05/20 1614)  Pain Intensity 1: 0 (02/05/20 1614)   Managed. Neurological Status:   Neuro (WDL): Within Defined Limits (02/05/20 1614)   At baseline. Mental Status and Level of Consciousness: Arousable. Pulmonary Status:   O2 Device: Nasal cannula (02/05/20 1614)   Adequate oxygenation and airway patent. Complications related to anesthesia: None    Post-anesthesia assessment completed. No concerns. Patient has met all discharge requirements.     Signed By: May Cheema CRNA    February 5, 2020

## 2020-02-05 NOTE — BRIEF OP NOTE
BRIEF OPERATIVE NOTE    Date of Procedure: 2/5/2020   Preoperative Diagnosis: UNILATERAL PRIMARY OSTEOARTHRITIS, RIGHT KNEE  Postoperative Diagnosis: UNILATERAL PRIMARY OSTEOARTHRITIS, RIGHT KNEE    Procedure(s):  RIGHT TOTAL KNEE REPLACEMENT  Surgeon(s) and Role:     Sierra Lund MD - Primary         Surgical Assistant: none    Surgical Staff:  Circ-1: Jayla Jackson; Abhishek Carrizales RN  Physician Assistant: Jeb Maya PA-C  Scrub Tech-1: Tracy Dueñas  Surg Asst-1: Britni Gonzalez  Event Time In Time Out   Incision Start 1402    Incision Close 1521      Anesthesia: General   Estimated Blood Loss: 50cc  Specimens: * No specimens in log *   Findings: severe DJD   Complications: none  Implants:   Implant Name Type Inv.  Item Serial No.  Lot No. LRB No. Used Action   PAT ASYM MTL-BK 11MM SZ A38 -- TRIATHLON - [id number]  PAT ASYM MTL-BK 11MM SZ A38 -- TRIATHLON  [employer] [facility] [id number] Right 1 Implanted   INSERT TIB CR TRIATHLN X3 6 9M --  - [id number]  INSERT TIB CR TRIATHLN X3 6 9M --   [employer] [facility] [id number] Right 1 Implanted   BASEPLT TIB PC TRITNM SZ 6 -- TRIATHLON - [id number]  BASEPLT TIB PC TRITNM SZ 6 -- TRIATHLON  [employer] [facility] [id number] Right 1 Implanted   COMPNT FEM CR TRIATHLN 5 R PA --  - [id number]  COMPNT FEM CR TRIATHLN 5 R PA --   [employer] [facility] [id number] Right [id number] Implanted

## 2020-02-05 NOTE — INTERVAL H&P NOTE
H&P Update: 
Enedina Juárez was seen and examined. History and physical has been reviewed. The patient has been examined. There have been no significant clinical changes since the completion of the originally dated History and Physical. 
Patient identified by surgeon; surgical site was confirmed by patient and surgeon.

## 2020-02-06 VITALS
SYSTOLIC BLOOD PRESSURE: 127 MMHG | HEART RATE: 64 BPM | OXYGEN SATURATION: 95 % | HEIGHT: 69 IN | TEMPERATURE: 98.1 F | RESPIRATION RATE: 14 BRPM | DIASTOLIC BLOOD PRESSURE: 56 MMHG | BODY MASS INDEX: 39.43 KG/M2 | WEIGHT: 266.19 LBS

## 2020-02-06 LAB
ANION GAP SERPL CALC-SCNC: 4 MMOL/L (ref 3–18)
BUN SERPL-MCNC: 18 MG/DL (ref 7–18)
BUN/CREAT SERPL: 13 (ref 12–20)
CALCIUM SERPL-MCNC: 7.7 MG/DL (ref 8.5–10.1)
CHLORIDE SERPL-SCNC: 104 MMOL/L (ref 100–111)
CO2 SERPL-SCNC: 30 MMOL/L (ref 21–32)
CREAT SERPL-MCNC: 1.37 MG/DL (ref 0.6–1.3)
ERYTHROCYTE [DISTWIDTH] IN BLOOD BY AUTOMATED COUNT: 13.8 % (ref 11.6–14.5)
GLUCOSE BLD STRIP.AUTO-MCNC: 142 MG/DL (ref 70–110)
GLUCOSE SERPL-MCNC: 143 MG/DL (ref 74–99)
HCT VFR BLD AUTO: 40.1 % (ref 36–48)
HGB BLD-MCNC: 12.6 G/DL (ref 13–16)
MCH RBC QN AUTO: 26.8 PG (ref 24–34)
MCHC RBC AUTO-ENTMCNC: 31.4 G/DL (ref 31–37)
MCV RBC AUTO: 85.3 FL (ref 74–97)
PLATELET # BLD AUTO: 169 K/UL (ref 135–420)
PMV BLD AUTO: 10.3 FL (ref 9.2–11.8)
POTASSIUM SERPL-SCNC: 4.2 MMOL/L (ref 3.5–5.5)
RBC # BLD AUTO: 4.7 M/UL (ref 4.7–5.5)
SODIUM SERPL-SCNC: 138 MMOL/L (ref 136–145)
WBC # BLD AUTO: 9.9 K/UL (ref 4.6–13.2)

## 2020-02-06 PROCEDURE — 74011250637 HC RX REV CODE- 250/637: Performed by: ORTHOPAEDIC SURGERY

## 2020-02-06 PROCEDURE — 97116 GAIT TRAINING THERAPY: CPT

## 2020-02-06 PROCEDURE — 97530 THERAPEUTIC ACTIVITIES: CPT

## 2020-02-06 PROCEDURE — 82962 GLUCOSE BLOOD TEST: CPT

## 2020-02-06 PROCEDURE — 97166 OT EVAL MOD COMPLEX 45 MIN: CPT

## 2020-02-06 PROCEDURE — 77030012890

## 2020-02-06 PROCEDURE — 36415 COLL VENOUS BLD VENIPUNCTURE: CPT

## 2020-02-06 PROCEDURE — 80048 BASIC METABOLIC PNL TOTAL CA: CPT

## 2020-02-06 PROCEDURE — 74011250637 HC RX REV CODE- 250/637: Performed by: PHYSICIAN ASSISTANT

## 2020-02-06 PROCEDURE — 97535 SELF CARE MNGMENT TRAINING: CPT

## 2020-02-06 PROCEDURE — 74011250636 HC RX REV CODE- 250/636: Performed by: PHYSICIAN ASSISTANT

## 2020-02-06 PROCEDURE — 77030012893

## 2020-02-06 PROCEDURE — 85027 COMPLETE CBC AUTOMATED: CPT

## 2020-02-06 RX ORDER — MELOXICAM 15 MG/1
15 TABLET ORAL DAILY
Qty: 30 TAB | Refills: 0 | Status: SHIPPED | OUTPATIENT
Start: 2020-02-06 | End: 2020-03-07

## 2020-02-06 RX ORDER — NALOXONE HYDROCHLORIDE 4 MG/.1ML
SPRAY NASAL
Qty: 3 EACH | Refills: 0 | Status: SHIPPED | OUTPATIENT
Start: 2020-02-06

## 2020-02-06 RX ORDER — OXYCODONE AND ACETAMINOPHEN 5; 325 MG/1; MG/1
1-2 TABLET ORAL
Qty: 30 TAB | Refills: 0 | Status: SHIPPED | OUTPATIENT
Start: 2020-02-06 | End: 2020-02-09

## 2020-02-06 RX ADMIN — SENNOSIDES 8.6 MG: 8.6 TABLET, FILM COATED ORAL at 08:20

## 2020-02-06 RX ADMIN — MELOXICAM 15 MG: 7.5 TABLET ORAL at 08:20

## 2020-02-06 RX ADMIN — VALSARTAN: 160 TABLET, FILM COATED ORAL at 08:20

## 2020-02-06 RX ADMIN — CEFAZOLIN SODIUM 3 G: 10 INJECTION, POWDER, FOR SOLUTION INTRAVENOUS at 04:09

## 2020-02-06 RX ADMIN — RIVAROXABAN 10 MG: 10 TABLET, FILM COATED ORAL at 06:13

## 2020-02-06 RX ADMIN — OXYCODONE HYDROCHLORIDE AND ACETAMINOPHEN 1 TABLET: 5; 325 TABLET ORAL at 11:14

## 2020-02-06 NOTE — PROGRESS NOTES
Progress Note        Patient: Aryan Herring MRN: [id number]  SSN: [id number]    YOB: 1950  Age: 77 y.o. Sex: male      1 Day Post-Op status post Procedure(s) (LRB):  RIGHT TOTAL KNEE REPLACEMENT (Right)    Admit Date: 2020  Admit Diagnosis: Osteoarthritis of right knee [M17.11]    Subjective:      Doing well. No complaints. No SOB. No Chest Pain. No Nausea or Vomiting. No problems eating or voiding. Objective:        Temp (24hrs), Av.1 °F (36.7 °C), Min:97.7 °F (36.5 °C), Max:98.4 °F (36.9 °C)    Body mass index is 39.88 kg/m². Patient Vitals for the past 12 hrs:   BP Temp Pulse Resp SpO2   20 0327 104/59 98 °F (36.7 °C) 62 14 97 %   20 2321 121/65 97.7 °F (36.5 °C) 69 14 97 %   20 1934 130/64 97.8 °F (36.6 °C) 77 14 92 %     Recent Labs     20  0340   HGB 12.6*   HCT 40.1      K 4.2      CO2 30   BUN 18   CREA 1.37*   *       Physical Exam:  Vital Signs are Stable. No Acute Distress. Alert and Oriented. Negative Homans sign. Toes AROM Full. Neurovascular exam is normal.    Dressing is Clean, Dry, and Intact. Assessment/Plan:     1. Continue oob/rehab  2. Dressing change today  3. Continue PT/OT  4. Continue ROM  5.  Discharge planning    Discharge Plan: Home    Signed By: Nancie Huitron PA-C     2020

## 2020-02-06 NOTE — PROGRESS NOTES
0800  Assumed care of pt at this time. Assessment complete. Pt alert and oriented x 4. Denies SOB and chest pain. Pt lungs Clear bilaterally. Cap refill  less than 3 seconds. Pt denies numbness and tingling to all extremities. Stated pain 4/10. Pt has 18 G IV to L hand. Pt has ACE bandage dressing to right knee CDI . Plexis bilaterally and TEDs in place to LLE. Pt encouraged to continue use of IS. Pt verbalized understanding. Ice pack applied. Call light and possessions within reach. Bed in low position. Will continue to monitor. Fall risk arm band in place    1044  Dual AVS reviewed with MAX Parish RN. All medications reviewed individually with patient. Opportunities for questions and concerns provided. Patient to be discharged via (mode of transport ie. Car, ambulance or air transport) car. Patient's arm band appropriately discarded.     IV removed  Pt d/c paper signed copied and placed In chart

## 2020-02-06 NOTE — PROGRESS NOTES
Problem: Mobility Impaired (Adult and Pediatric)  Goal: *Acute Goals and Plan of Care (Insert Text)  Description  Physical Therapy Goals   Initiated 2/5/2020 and to be accomplished within 3-5 day(s)  1. Patient will move from supine <> sit with S in prep for out of bed activity and change of position. 2.  Patient will perform sit<> stand with S with LRAD in prep for transfers/ambulation. 3.  Patient will transfer from bed <> chair with S with LRAD for time up in chair for completion of ADL activity. 4.  Patient will ambulate 150 feet with LRAD/S for improved functional mobility/safe discharge. 5.  Patient will ascend/descend 3-5 stairs with handrails with minimal assistance/contact guard assist for home re-entry as needed. Outcome: Progressing Towards Goal   PHYSICAL THERAPY EVALUATION    Patient: Cindy Perez (87 y.o. male)  Date: 2/5/2020  Primary Diagnosis: Osteoarthritis of right knee [M17.11]  Procedure(s) (LRB):  RIGHT TOTAL KNEE REPLACEMENT (Right) Day of Surgery   Precautions: Fall, WBAT    ASSESSMENT :  Based on the objective data described below, the patient presents with decrease independence w/ bed mobility, transfers, gait, and step negotiation. Pt seen in supine prior to session w/ supplemental O2, IV, BP, pulse ox, and B/L SCDs donned. Pt reported no pain at this time in  R knee but reports discomfort. Pt has no c/o lightheadedness, dizziness or nausea. Pt's vitals assessed WNLs. P Pt able to ambulate in the hallway w/ RW/GB and demonstrates a step to, antalgic gait, decrease michael, decrease stride length, and decrease step clearance. Pt transferred back to room where pt was left in supine after session, call bell and tray in reach, B/L SCDs donned, vitals assessed WNLs, nurse notified after session. Patient will benefit from skilled intervention to address the above impairments.   Patients rehabilitation potential is considered to be Good  Factors which may influence rehabilitation potential include:   []         None noted  []         Mental ability/status  []         Medical condition  [x]         Home/family situation and support systems  [x]         Safety awareness  [x]         Pain tolerance/management  []         Other:      PLAN :  Recommendations and Planned Interventions:  [x]           Bed Mobility Training             [x]    Neuromuscular Re-Education  [x]           Transfer Training                   []    Orthotic/Prosthetic Training  [x]           Gait Training                          []    Modalities  [x]           Therapeutic Exercises          []    Edema Management/Control  [x]           Therapeutic Activities            [x]    Patient and Family Training/Education  []           Other (comment):    Frequency/Duration: Patient will be followed by physical therapy twice daily to address goals. Discharge Recommendations: Home Health  Further Equipment Recommendations for Discharge: N/A     SUBJECTIVE:   Patient stated I feel better walking surprisingly.     OBJECTIVE DATA SUMMARY:     Past Medical History:   Diagnosis Date    Adverse effect of anesthesia     bp dropped when given meds for 1st colonoscopy    Arthritis     Hypertension many years    Ill-defined condition     1 episode of irregular heartbeat 15 years ago, was checked, no problems found     Past Surgical History:   Procedure Laterality Date    HX KNEE REPLACEMENT Left 01/2018    HX OTHER SURGICAL      colonoscopy    HX TONSILLECTOMY  very young     Barriers to Learning/Limitations: yes;  physical  Compensate with: Verbal Cues and Tactile Cues  Prior Level of Function/Home Situation:   Home Situation  Home Environment: Private residence  # Steps to Enter: 4  Rails to Enter: Yes  Hand Rails : Bilateral  One/Two Story Residence: One story  Living Alone: No  Support Systems: Spouse/Significant Other/Partner  Patient Expects to be Discharged to[de-identified] Private residence  Current DME Used/Available at Home: Walker, rolling  Critical Behavior:  Neurologic State: Alert  Orientation Level: Oriented X4  Cognition: Appropriate decision making  Psychosocial  Purposeful Interaction: Yes  Pt Identified Daily Priority: Clinical issues (comment)  Caritas Process: Nurture loving kindness;Establish trust  Skin Condition/Temp: Dry;Warm  Skin Integrity: Incision (comment)  Skin Integumentary  Skin Color: Appropriate for ethnicity  Skin Condition/Temp: Dry;Warm  Skin Integrity: Incision (comment)  Turgor: Non-tenting  Hair Growth: Present  Varicosities: Absent  Strength:    Strength: Generally decreased, functional  Tone & Sensation:   Tone: Normal  Sensation: Intact  Range Of Motion:  AROM: Generally decreased, functional  Functional Mobility:  Bed Mobility:  Supine to Sit: Stand-by assistance  Sit to Supine: Stand-by assistance  Scooting: Stand-by assistance  Transfers:  Sit to Stand: Contact guard assistance  Stand to Sit: Contact guard assistance  Balance:   Sitting: Intact  Standing: Intact; With support  Ambulation/Gait Training:  Distance (ft): 80 Feet (ft)  Assistive Device: Gait belt;Walker, rolling  Ambulation - Level of Assistance: Contact guard assistance  Gait Description (WDL): Exceptions to WDL  Gait Abnormalities: Antalgic;Decreased step clearance; Step to gait  Right Side Weight Bearing: As tolerated  Base of Support: Narrowed  Speed/Jenna: Slow  Step Length: Left shortened;Right shortened  Swing Pattern: Left asymmetrical;Right asymmetrical  Therapeutic Exercises: Therex packet handed to pt prior to assessment  Pain:  Pain Scale 1: Numeric (0 - 10)  Pain Intensity 1: 0  Activity Tolerance:   Fair  Please refer to the flowsheet for vital signs taken during this treatment.   After treatment:   [x]         Patient left in no apparent distress sitting up in chair  [x]         Patient left in no apparent distress in bed  [x]         Call bell left within reach  []         Nursing notified  [x]         Caregiver present (spouse)  []         Bed alarm activated    COMMUNICATION/EDUCATION:   [x]         Fall prevention education was provided and the patient/caregiver indicated understanding. [x]         Patient/family have participated as able in goal setting and plan of care. [x]         Patient/family agree to work toward stated goals and plan of care. []         Patient understands intent and goals of therapy, but is neutral about his/her participation. []         Patient is unable to participate in goal setting and plan of care.     Thank you for this referral.  Ludy Knapp, PT   Time Calculation: 26 mins   Eval Complexity: History: HIGH Complexity :3+ comorbidities / personal factors will impact the outcome/ POC Exam:LOW Complexity : 1-2 Standardized tests and measures addressing body structure, function, activity limitation and / or participation in recreation  Presentation: LOW Complexity : Stable, uncomplicated  Clinical Decision Making:Low Complexity ambulate >30ft  Overall Complexity:LOW

## 2020-02-06 NOTE — PROGRESS NOTES
1958 -  Head to toe assessment performed at this time. Pt denies any chest pain or SOB. Pt denies any numbness or tingling to extremities. Pt encouraged to manage pain and to use the incentive spirometer. Pt educated on the side effects of medications taken. Pt left with call light within reach and bed in low position. Will continue to monitor. 2350 -  Pt in bed sleeping with no signs of distress. Will continue to monitor. 0100 - Pt in bed sleeping with no signs of distress. Will continue to monitor. Shift summary - Pt pain managed with prn medication per MAR. Pt informed about all medications and side effects and encouraged to ask questions about medication. Pt encouraged throughout the night to use incentive spirometer and the purpose of the incentive spirometer. Pt left with no signs of distress and any concerns of pt addressed.

## 2020-02-06 NOTE — DISCHARGE SUMMARY
Patient: Camila Valdes               Sex: male         MRN: [id number]       YOB: 1950      Age:  77 y.o.        LOS:  LOS: 0 days                DOA: 2/5/2020    Discharge Date: 2/6/2020    Admission Diagnoses: Osteoarthritis of right knee [M17.11]    Discharge Diagnoses:    Problem List as of 2/6/2020 Date Reviewed: 2/5/2020          Codes Class Noted - Resolved    Osteoarthritis of right knee ICD-10-CM: M17.11  ICD-9-CM: 715.96  2/5/2020 - Present        Osteoarthritis of left knee ICD-10-CM: M17.12  ICD-9-CM: 715.96  1/2/2019 - Present              This is a 77 y.o. male with a  history of ongoing knee pain secondary to degenerative joint disease. The patient has failed to respond to conservative care. The option of a total knee arthroplasty was discussed with the patient. Risks and benefits of the procedure were explained to the patient as well as other treatment options and possible surgical outcomes. The patient acknowledged and consent was obtained. The patient was therefore scheduled to undergo a right total knee arthroplasty with Dr. Cirs Wynn. The patient was taken to the operating room for the above-stated procedure. IV antibiotics were given prior to the incision. SCDs were used for DVT prophylaxis. The patient had an estimated intraoperative blood loss of 100 mL. The patient tolerated the procedure well without any complications, and was taken to the recovery room in stable condition. The patient was then transferred to the postoperative orthopedic floor for convalescence, PT, pain management, as well as discharge planning. A consultations was made to the hospitalist whom continued to monitor the patient throughout the patient's hospitalizations. Physical therapy and occupational therapy initiated their evaluation and treatment and continued to follow the patient until the patient was discharged.  For pain control, a femoral nerve block was used, and was discontinued on post-operative day 2. The patient then was transitioned over to oral narcotics in which was well tolerated. DVT prophylaxis was initiated on the day of surgery including; Xarelto, compression stockings and bilateral foot pumps. At the time of discharge, the patient was able to ambulate safely, go up and down stairs and had an understanding of the explicit discharge precautions and instructions following surgery. Home Health will come out to assist the pateint with this,. The patient was discharged to follow up with Dr. Irma Melo in approximately 10 to 14 days. Discharge Condition: Stable  DISPOSITION: Home. On the day of discharge the patient was afebrile. Vital signs were stable. The patient was in no acute distress. his Right knee incision was clean, dry, and intact. Extremity was warm and well-perfused, distally neurovascularly intact. DISCHARGE INSTRUCTIONS:  The patient will be discharged home on Xarelto 10mg daily for 12 days from the date of surgery. Continue physical therapy for range of motion, gait training and strengthening. Continue therapeutic exercises. Follow up in 10 to 14 days with Dr. Irma Melo. DISCHARGE MEDICATIONS:   Current Discharge Medication List      START taking these medications    Details   meloxicam (MOBIC) 15 mg tablet Take 1 Tab by mouth daily for 30 days. Qty: 30 Tab, Refills: 0      oxyCODONE-acetaminophen (PERCOCET) 5-325 mg per tablet Take 1-2 Tabs by mouth every four (4) hours as needed for Pain for up to 3 days. Max Daily Amount: 12 Tabs. Qty: 30 Tab, Refills: 0    Associated Diagnoses: Primary osteoarthritis of right knee      rivaroxaban (XARELTO) 10 mg tablet Take 1 Tab by mouth daily for 11 days. Qty: 11 Tab, Refills: 0      naloxone (NARCAN) 4 mg/actuation nasal spray Use 1 spray intranasally, then discard. Repeat with new spray every 2 min as needed for opioid overdose symptoms, alternating nostrils.   Qty: 3 Each, Refills: 0         CONTINUE these medications which have NOT CHANGED    Details   metoprolol tartrate (LOPRESSOR) 50 mg tablet Take 75 mg by mouth nightly. valsartan-hydroCHLOROthiazide (DIOVAN-HCT) 160-25 mg per tablet Take 1 Tab by mouth daily. fluticasone propionate (FLONASE ALLERGY RELIEF) 50 mcg/actuation nasal spray 2 Sprays by Both Nostrils route as needed.          STOP taking these medications       acetaminophen (TYLENOL) 500 mg tablet Comments:   Reason for Stopping:         naproxen sodium (ALEVE) 220 mg cap Comments:   Reason for Stopping:

## 2020-02-06 NOTE — PROGRESS NOTES
Transition of Care (JOBY) Plan:     Chart reviewed, met with pt in room prior to discharge home with family. FOC offered, pt chose [facility] [phone] for follow up; referral placed with CMS. Pt has RW for home. JOBY Transportation:   How is patient being transported at discharge? Family/Friend      When? Once cleared by Therapy between 12-2pm     Is transport scheduled? N/A      Follow-up appointment and transportation:   PCP/Specialist?  See AVS for Appointment         Who is transporting to the follow-up appointment? Family/Friend      Is transport for follow up appointment scheduled? N/A    Communication plan (with patient/family): Who is being called? Patient or Next of Kin? Responsible party? Patient      What number(s) is to be used? See Facesheet      What service provider is calling for [facility] services? When are they calling? 24-48 hours following discharge    Readmission Risk? (Green/Low; Yellow/Moderate; Red/High):  Green    Care Management Interventions  PCP Verified by CM:  Yes  Transition of Care Consult (CM Consult): [address]: No  Reason Outside [place]: Physician referred to specific agency([facility])  Discharge Durable Medical Equipment: No  Physical Therapy Consult: Yes  Occupational Therapy Consult: Yes  Current Support Network: Lives with Spouse  Confirm Follow Up Transport: Family  The Plan for Transition of Care is Related to the Following Treatment Goals : HH  The Patient and/or Patient Representative was Provided with a Choice of Provider and Agrees with the Discharge Plan?: Yes  Freedom of Choice List was Provided with Basic Dialogue that Supports the Patient's Individualized Plan of Care/Goals, Treatment Preferences and Shares the Quality Data Associated with the Providers?: Yes  Discharge Location  Discharge Placement: Home with home health

## 2020-02-06 NOTE — OP NOTES
TOTAL KNEE ARTHROPLASTY OP NOTE      OPERATIVE REPORT    Patient: Erica Loyd CSN: [id number] SSN: [id number]    YOB: 1950  Age: 77 y.o. Sex: male      Admit Date: 2/5/2020  Admit Diagnosis: Osteoarthritis of right knee [M17.11]    Date of Procedure: 2/5/2020   Preoperative Diagnosis: UNILATERAL PRIMARY OSTEOARTHRITIS, RIGHT KNEE  Postoperative Diagnosis: UNILATERAL PRIMARY OSTEOARTHRITIS, RIGHT KNEE    Procedure: Procedure(s):  RIGHT TOTAL KNEE REPLACEMENT  Surgeon: Vicky Massey MD  Assistant(s):  CASTILLO Barker  Anesthesia: General   Estimated Blood Loss:<50CC  Specimens: * No specimens in log *   Complications: None  Implants:   Implant Name Type Inv. Item Serial No.  Lot No. LRB No. Used Action   PAT ASYM MTL-BK 11MM SZ A38 -- TRIATHLON - [id number]  PAT ASYM MTL-BK 11MM SZ A38 -- TRIATHLON  [employer] HOW [id number] Right 1 Implanted   INSERT TIB CR TRIATHLN X3 6 9M --  - [id number]  INSERT TIB CR TRIATHLN X3 6 9M --   [employer] HOW [id number] Right 1 Implanted   BASEPLT TIB PC TRITNM SZ 6 -- TRIATHLON - [id number]  BASEPLT TIB PC TRITNM SZ 6 -- TRIATHLON  [employer] HOW [id number] Right 1 Implanted   COMPNT FEM CR TRIATHLN 5 R PA --  - [id number]  COMPNT FEM CR TRIATHLN 5 R PA --   [employer] HOW [id number] Right 1 Implanted         INDICATIONS: The patient is a 77 y.o., male who has who has been suffering from knee arthritis. He has failed conservative therapy including activity modification, anti-inflammatories and injections. The arthritis is significantly impacting the patient's quality of life. We did discuss the option of total knee arthroplasty with them at length. He understood the risks and benefits including the risks of infection and blood clot, and elected to proceed with knee replacement.     DESCRIPTION OF PROCEDURE: After being informed of the risks and benefits, which include, but are not limited to, bleeding, infection, neurovascular damage, wound complications, pain and stiffness in the knee, periprosthetic loosening, fracture dislocation and DVT, the patient consented for the procedure. The patient was brought to the operating suite and properly identified. He was placed supine upon the operating table and placed under a general anesthetic. A regional block was placed in preoperative holding. Once an adequate level of anesthesia was obtained, a tourniquet was placed high on the operative thigh. The leg was prepped and draped in the usual sterile fashion. The leg was exsanguinated with an Esmarch. The tourniquet was inflated to 280 mmHg. Tourniquet time was approximately 1 hour. A longitudinal incision was made centered over the patella. Dissection was carried down through the subcutaneous tissue. The underlying capsule was identified and a medial parapatellar arthrotomy was made in standard fashion. The joint was exposed and evaluated. There were severe arthritic changes noted. The patella was everted. Approximately 1 cm of the articular surface was removed in a freehand fashion. Once that was completed, the knee was flexed. The distal intramedullary guide was placed and a 5-degree, 8-mm cut was made in standard fashion. The distal femur was sized, the 4-in-1 cutting block was placed in line with the posterior condyle and epicondylar access, and the anterior, posterior, and chamfer cuts were made in standard fashion. Attention was next turned to the tibia. Using the extramedullary tibial guide parallel to the axis of the tibia, we resected 9 mm below the higher plateau. The remaining plateau was sized. All osteophytes were removed. The remaining medial and lateral meniscus was excised. The trial implants were then placed with a trial insert. The knee had full flexion, full extension and good stability throughout. The PCL was noted to be intact. The patella was sized to the appropriate button.  The keel holes were drilled, and the poly was placed. The patella tracked nicely throughout full range of motion. The trial implants were then removed. The bone ends were irrigated and dried. The final implants were then pressed into place beginning with the tibia, followed by the femur, and lastly the patella. The polyethylene spacer was locked into the tibial tray. The tourniquet was deflated. Bleeding was controlled with electrocautery. Bleeding was not felt to be excessive enough to warrant a drain. The capsule was closed with a #1 Vicryl in a figure-of-eight interrupted fashion. Subcutaneous tissue was closed with 2-0 Vicryl. The skin was closed with 3-0 Monocryl in a running subcuticular fashion. Steri-strips and a sterile compressive dressing was applied. The patient was awakened and transferred to the recovery in stable condition. All needle and sponge counts were reported as correct at the end of the case.

## 2020-02-06 NOTE — PROGRESS NOTES
Problem: Mobility Impaired (Adult and Pediatric)  Goal: *Acute Goals and Plan of Care (Insert Text)  Description  Physical Therapy Goals   Initiated 2/5/2020 and to be accomplished within 3-5 day(s)  1. Patient will move from supine <> sit with S in prep for out of bed activity and change of position. 2.  Patient will perform sit<> stand with S with LRAD in prep for transfers/ambulation. 3.  Patient will transfer from bed <> chair with S with LRAD for time up in chair for completion of ADL activity. 4.  Patient will ambulate 150 feet with LRAD/S for improved functional mobility/safe discharge. 5.  Patient will ascend/descend 3-5 stairs with handrails with minimal assistance/contact guard assist for home re-entry as needed. Outcome: Resolved/Met  Note:   PHYSICAL THERAPY TREATMENT/DISCHARGE    Patient: Camila Valdes (10 y.o. male)  Date: 2/6/2020  Diagnosis: Osteoarthritis of right knee [M17.11]   <principal problem not specified>  Procedure(s) (LRB):  RIGHT TOTAL KNEE REPLACEMENT (Right) 1 Day Post-Op  Precautions: Fall, WBAT  Chart, physical therapy assessment, plan of care and goals were reviewed. ASSESSMENT:  Pt rating pain in R knee 3/10 on numerical pain scale. Pt able to participate w/ HEP, transfer training, gt training and stair training meeting goals for this level of PT. Pt is ready to transition to HHPT. Pt left sitting EOB w/ all needs within reach. Nurse aware. Progression toward goals:  [x]      Goals met  []      Improving appropriately and progressing toward goals  []      Improving slowly and progressing toward goals  []      Not making progress toward goals and plan of care will be adjusted     PLAN:  Patient will be discharged from physical therapy at this time.   Rationale for discharge:  [x] Goals Achieved  [] [address]  [] Patient not participating in therapy  [] Other:  Discharge Recommendations:  Home Health  Further Equipment Recommendations for Discharge:  N/A SUBJECTIVE:   Patient stated I know from last time.     OBJECTIVE DATA SUMMARY:   Critical Behavior:  Neurologic State: Alert, Appropriate for age  Orientation Level: Oriented X4  Cognition: Appropriate decision making, Appropriate for age attention/concentration, Appropriate safety awareness, Follows commands  Functional Mobility Training:  Bed Mobility:  Supine to Sit: Supervision  Sit to Supine: Supervision  Scooting: Supervision  Transfers:  Sit to Stand: Supervision  Stand to Sit: Supervision  Balance:  Sitting: Intact  Standing: Intact; With support  Ambulation/Gait Training:  Distance (ft): 160 Feet (ft)  Assistive Device: Walker, rolling;Gait belt  Ambulation - Level of Assistance: Contact guard assistance  Gait Abnormalities: Antalgic;Decreased step clearance; Step to gait  Right Side Weight Bearing: As tolerated  Base of Support: Narrowed  Stance: Right decreased  Speed/Jenna: Slow  Step Length: Left shortened;Right shortened  Swing Pattern: Left asymmetrical;Right asymmetrical  Interventions: Safety awareness training; Tactile cues; Verbal cues; Visual/Demos  Stairs:  Number of Stairs Trained: 10  Stairs - Level of Assistance: Stand-by assistance(vc)   Rail Use: Both  Neuro Re-Education:  Therapeutic Exercises:   Reviewed HEP w/ pt for competency. Pain:  Pain Scale 1: Numeric (0 - 10)  Pain Intensity 1: 3  Pain Location 1: Knee  Pain Orientation 1: Right  Activity Tolerance:   Fair+  Please refer to the flowsheet for vital signs taken during this treatment.   After treatment:   [] Patient left in no apparent distress sitting up in chair  [x] Patient left in no apparent distress in bed  [x] Call bell left within reach  [x] Nursing notified  [] Caregiver present  [] Bed alarm activated  James Mcnally PT   Time Calculation: 25 mins

## 2020-02-06 NOTE — DISCHARGE INSTRUCTIONS
Total Knee Arthroplasty Discharge Instructions   Nohemi Wang M.D. Please take the time to review the following instructions before you leave the hospital and use them as guidelines during your recovery from surgery. If you have any questions you may contact my office at [phone]. Wound Care / Dressing Changes: You may change your dressing as needed. Beginning the day after you are discharged from the hospital you should change your dressing daily. A big, bulky dressing isn't necessary as long as there isn't any drainage from the incisions. You can put a band-aid or mepilex dressing over the incision. It isn't necessary to apply antibiotic ointment to your incisions. There will be a clear film covering your incision, do not remove. As the edges begin to peel, you may trim with small scissors. This film will fall of in about 1 month. Rahel Reji / Bathing: You may only shower. You may shower if there is no drainage from your incisions. Your dressing may be removed for showering. You may get your incisions wet in the shower. Don't vigorously scrub the area where your incisions are. Apply a clean, dry dressing after drying off the area of your incisions. Don't take a tub bath, get in a swimming pool or Jacuzzi until the incisions are completely healed. Do not soak your incisions under water. Weight Bearing Status / Braces:     ___X__ Weight bearing as tolerated. Use crutches, walker, or cane as needed for support. You may move your joints as much as tolerated.     _____  Magdalena Sorensen Touch\" weight bearing. Don't bear weight on the leg you had operated on. Use your toes only to steady yourself as you ambulate with crutches or a walker. _____ Avoid extreme hip extension with external rotation. Home Health:    Home health has been arranged for skilled nursing visits and physical therapy. Your home health has been set up through____________ .  If no one from the agency calls you on the day after you arrive home, please contact them at the number provided at discharge. Physical therapy for gait training and strengthening. Continue therapeutic exercises. Physical Therapy:       Begin In-Home Physical Therapy 3 times a week to work on gait training, range of motion, strengthening, and weight bearing exercises as tolerable. Continue to use your walker or cane when walking. You may progress from the walker to a cane to full weight bearing as tolerable. Ice / Elevation:     Continue ice and elevation as needed for pain and swelling. Diet:     Resume your pre hospital diet. If you have excessive nausea or vomiting call your doctor's office. Medication:     1. You will be given a prescription for pain medication when you are discharged for the hospital. Take the medication as needed according to the directions on the prescription bottle. Possible side effects of the medication include dizziness, headache, nausea, vomiting, constipation and urinary retention. If you experience any of these side effects call the office so that we can assist you in relieving them. Discontinue the use of the pain medication if you develop itching, rash, shortness of breath or difficulties swallowing. If these symptoms become severe or aren't relieved by discontinuing the medication you should seek immediate medical attention. Refills of pain medication are authorized during office hours only. (8am - 5pm Mon. thru Fri.)     1. You should take Xarelto daily as directed for 12 days from the date of your surgery. This will help to prevent blood clots from forming in your legs. 2. You may resume the medication you were taking prior to your surgery. Pain medication may change the effects of any antidepressant medication you are taking.  If you have any questions about possible interactions between your regular medications and the pain medication you should consult the physician who prescribes your regular medications. 3.   Please take over the counter stool softeners while you are taking narcotic pain                  medication. Pain medications can cause constipation. Stool softeners, warm         prune juice and increasing your water and fiber intake can help prevent constipation. Do not take laxatives. Follow Up Appointment:   Please call [phone] for a follow appointment with Dr. Radha Rosen in 10-14 days from the time of your surgery. Please let our office know you are scheduling a post-op appointment. Signs and Symptoms to be Aware of: If any of the following signs and symptoms occur, you should contact Dr. Namita Serna office. Please be advised if a problem arises which you feel requires immediate medical attention or you are unable to contact Dr. Namita Serna office you should seek immediate medical attention at the emergency department or other health care facility you have access to. Signs and symptoms to watch for include:     1. A sudden increase in swelling and or redness or warmth at the area your surgery was performed which isn't relieved by rest, ice and elevation. 2 Oral temperature greater than 101.5 degrees for 12 hours or more which isn't relieved by an increase in fluid intake and taking two Tylenol every 4-6 hours. 3 Excessive drainage from your incisions, or drainage which hasn't stopped by 72 hours after your surgery despite applying a compressive dressing, ice and elevation. 4 Calfpain, tenderness, redness or swelling which isn't relieved with rest and elevation. 5 Fever, chills, shortness ofbreath, chest pain, nausea, vomiting or other signs and symptoms which are of concern to you.      Other Instructions:

## 2020-02-06 NOTE — PROGRESS NOTES
Problem: Falls - Risk of  Goal: *Absence of Falls  Description  Document Millie Maravilla Fall Risk and appropriate interventions in the flowsheet.   Outcome: Progressing Towards Goal  Note: Fall Risk Interventions:  Mobility Interventions: Utilize walker, cane, or other assistive device, Patient to call before getting OOB         Medication Interventions: Patient to call before getting OOB, Teach patient to arise slowly    Elimination Interventions: Call light in reach, Patient to call for help with toileting needs, Stay With Me (per policy), Urinal in reach

## 2020-02-06 NOTE — PROGRESS NOTES
Problem: Self Care Deficits Care Plan (Adult)  Goal: *Acute Goals and Plan of Care (Insert Text)  Description  Initial Occupational Therapy Goals (2/6/2020) Within  7 day(s):    1. Patient will perform grooming standing sinkside with supervision for increased independence in ADLs. 2. Patient will perform UB dressing with supervision seated EOB for increased independence with ADLs. 3. Patient will perform LB dressing with supervision & A/E PRN for increased independence with ADLs. 4. Patient will perform all aspects of toileting with supervision for increased independence with ADLs. 5. Patient will perform LB ADLs utilizing body mechanics & adaptive strategies with 1 verbal cue for increased safety in ADLs. 6. Patient will independently apply energy conservation techniques with 1 verbal cue(s)for increased independence with ADLs. Outcome: Resolved/Met   OCCUPATIONAL THERAPY EVALUATION/DISCHARGE    Patient: Ann Marie Rossi (8 y.o. male)  Date: 2/6/2020  Primary Diagnosis: Osteoarthritis of right knee [M17.11]  Procedure(s) (LRB):  RIGHT TOTAL KNEE REPLACEMENT (Right) 1 Day Post-Op   Precautions:  Fall, WBAT  PLOF: Per pt, he was independent with ADL's and transfers. Pt drives. He is retired. He enjoys watching his 35 year old granddaughter for fun. ASSESSMENT AND RECOMMENDATIONS:  Based on the objective data described below, the patient presents with decreased ROM and strength affecting LE ADLs. Pt received in supine. Pt rated his pain level a 4/10 at rest. Pt did supine to sit to the right side of the bed with supervision and HOB elevated slightly. Pt did LB ADL's seated on EOB. Pt donned/doffed his gripper socks with supervision and extra time. Patient able to bend forward to don socks. Pt will require assist w/ compression hose which patient reports that his wife can assist as needed. Pt did sit to stand from EOB with supervision and ambulated to BR with RW and supervision for balance/safety.  Pt did toilet transfer and toileting with supervision. Pt then ambulated to the sink and washed his hands in standing with supervision for balance/safety. Pt requested to return back to bed at the end of the session due to pain. Pt did sit to supine with supervision. Pt left resting in bed with call light in reach and all needs met. Education: Reviewed home safety, body mechanics, importance of moving every hour to prevent joint stiffness, role of ice for edema/pain control, Rolling Walker management/safety, and adaptive dressing techniques with patient verbalizing  understanding at this time     Skilled Occupational Therapy is not indicated at this time in this setting. Patient should continue to improve as pain and ROM/strength improves. Discharge Recommendations: Home Health  Further Equipment Recommendations for Discharge: N/A      SUBJECTIVE:   Patient stated I just had my left knee done ~1 year ago. I know what to expect.     OBJECTIVE DATA SUMMARY:     Past Medical History:   Diagnosis Date    Adverse effect of anesthesia     bp dropped when given meds for 1st colonoscopy    Arthritis     Hypertension many years    Ill-defined condition     1 episode of irregular heartbeat 15 years ago, was checked, no problems found     Past Surgical History:   Procedure Laterality Date    HX KNEE REPLACEMENT Left 01/2018    HX OTHER SURGICAL      colonoscopy    HX TONSILLECTOMY  very young     Barriers to Learning/Limitations: None  Compensate with: visual, verbal, tactile, kinesthetic cues/model    Home Situation/Prior level of Function:   Home Situation  Home Environment: Private residence  # Steps to Enter: 4  Rails to Enter: Yes  Hand Rails : Bilateral  One/Two Story Residence: One story  Living Alone: No  Support Systems: Spouse/Significant Other/Partner  Patient Expects to be Discharged to[de-identified] Private residence  Current DME Used/Available at Home: [employer], straight, Walker, rolling, [employer] chair, Grab bars  Tub or [employer] Type: Shower  [x]  Right hand dominant   []  Left hand dominant    Cognitive/Behavioral Status:  Neurologic State: Alert; Appropriate for age  Orientation Level: Oriented X4  Cognition: Appropriate decision making; Appropriate for age attention/concentration; Appropriate safety awareness; Follows commands  Safety/Judgement: Awareness of environment    Skin: right knee incision w/ Mepilex/Orthofoam   Edema: compression hose in place & applied ice     Coordination:  Coordination: Within functional limits  Fine Motor Skills-Upper: Left Intact; Right Intact    Gross Motor Skills-Upper: Left Intact; Right Intact    Balance:  Sitting: Intact  Standing: Intact; With support    Strength: BUE  Strength: Generally decreased, functional     Tone & Sensation:BUE  Tone: Normal  Sensation: Intact     Range of Motion:BUE  AROM: Generally decreased, functional     Functional Mobility and Transfers for ADLs:  Bed Mobility:  Supine to Sit: Supervision  Sit to Supine: Supervision  Scooting: Supervision  Transfers:  Sit to Stand: Supervision  Stand to Sit: Supervision   Toilet Transfer : Supervision    ADL Assessment/Intervention:  Feeding: Setup  Oral Facial Hygiene/Grooming: Supervision  Bathing: Supervision  Upper Body Dressing: Supervision  Lower Body Dressing: Supervision  Toileting: Supervision      Grooming  Grooming Assistance: Supervision  Position Performed: Standing  Washing Hands: Supervision      Lower Body Dressing Assistance  Dressing Assistance: Supervision  Socks: Supervision  Leg Crossed Method Used: No  Position Performed: Seated edge of bed    LE Adaptive Equipment:  [x] Adaptive Equipment was not issued due to patient able to complete with out use of AE and use of AE would prevent stretching needed to progress with recovery. (Patient able to complete w/ compensatory strategies and able to compensate for socks w/ clothing modifications, but will require assist with Compression hose).      Toileting  Toileting Assistance: Supervision  Bladder Hygiene: Supervision  Clothing Management: Supervision    Cognitive Retraining  Safety/Judgement: Awareness of environment    Pain:  Pain level pre-treatment: 4/10  Pain level post-treatment: 4/10  Pain Intervention(s): Medication administer by Nursing (see MAR); Rest, Ice, Repositioning   Response to intervention: Nurse notified, see doc flow sheet    Activity Tolerance:   Fair. Patient able to stand 2 minute(s) in bathroom. Patient able to complete ADLs with intermittent rest breaks. Patient limited by pain. Please refer to the flowsheet for vital signs taken during this treatment. After treatment:   []  Patient left in no apparent distress sitting up in chair  []  Patient sitting on EOB  [x]  Patient left in no apparent distress in bed  [x]  Call bell left within reach  [x]  Nursing notified  []  Caregiver present  [x]  Ice applied  []  SCD's on while back in bed    COMMUNICATION/EDUCATION:   Communication/Collaboration:  [x]       Role of Occupational Therapy in the acute care setting. [x]      Home safety education was provided and the patient/caregiver indicated understanding. [x]      Patient/family have participated as able in goal setting and plan of care. [x]      Patient/family agree to work toward stated goals and plan of care. []      Patient understands intent and goals of therapy, but is neutral about his/her participation. []      Patient is unable to participate in plan of care at this time. Thank you for this referral.  Sondra Barahona OTR/L MOT  Time Calculation: 27 mins    Eval Complexity: History: LOW Complexity : Brief history review ; Examination: MEDIUM Complexity : 3-5 performance deficits relating to physical, cognitive , or psychosocial skils that result in activity limitations and / or participation restrictions; Decision Making:MEDIUM Complexity : Patient may present with comorbidities that affect occupational performnce.  Miniml to moderate modification of tasks or assistance (eg, physical or verbal ) with assesment(s) is necessary to enable patient to complete evaluation

## 2020-02-06 NOTE — ROUTINE PROCESS
Bedside and Verbal shift change report given to Marce HATCH RN (oncoming nurse) by Meghana Pastrana RN (offgoing nurse). Report included the following information SBAR, Kardex, MAR and Recent Results.

## 2020-02-06 NOTE — ROUTINE PROCESS
Bedside and Verbal shift change report given to KARIE Marquis RN (oncoming nurse) by YUSRA Griffiths RN (offgoing nurse). Report included the following information SBAR, Kardex, Intake/Output, MAR and Recent Results.

## 2023-12-15 NOTE — PERIOP NOTES
Family updated via telephone with patient PACU status and room assignment. Family moving car, and will wait for patient in 2-south waiting room. No Vaccines Administered.

## (undated) DEVICE — STERILE POLYISOPRENE POWDER-FREE SURGICAL GLOVES WITH EMOLLIENT COATING: Brand: PROTEXIS

## (undated) DEVICE — OSCILLATING TIP SAW CARTRIDGE: Brand: PRECISION FALCON

## (undated) DEVICE — STERILE POLYISOPRENE POWDER-FREE SURGICAL GLOVES: Brand: PROTEXIS

## (undated) DEVICE — SOL IRRIGATION INJ NACL 0.9% 500ML BTL

## (undated) DEVICE — PACK PROCEDURE SURG TOT KNEE CUST

## (undated) DEVICE — SOLUTION IRRIG 3000ML LAC R FLX CONT

## (undated) DEVICE — SUT VCRL + 2-0 27IN CT2 VIO --

## (undated) DEVICE — SYR LR LCK 1ML GRAD NSAF 30ML --

## (undated) DEVICE — KENDALL SCD EXPRESS FOOT CUFF, MEDIUM: Brand: KENDALL SCD

## (undated) DEVICE — NEEDLE HYPO 21GA L1.5IN INTRAMUSCULAR S STL LATCH BVL UP

## (undated) DEVICE — SUTURE MCRYL SZ 3-0 L27IN ABSRB UD L24MM PS-1 3/8 CIR PRIM Y936H

## (undated) DEVICE — (D)PREP SKN CHLRAPRP APPL 26ML -- CONVERT TO ITEM 371833

## (undated) DEVICE — SUT VCRL + 1 36IN CT1 VIO --

## (undated) DEVICE — SOL IRR STRL H2O 1500ML BTL --

## (undated) DEVICE — STERILE TETRA-FLEX CF LF, 6IN X 11 YD: Brand: TETRA-FLEX™ CF

## (undated) DEVICE — PIN FIX L3.5IN DIA1/8IN S STL FLUT HDLSS SQ END

## (undated) DEVICE — ADHESIVE SKIN CLOSURE 4X22 CM PREMIERPRO EXOFINFUSION DISP

## (undated) DEVICE — DRSG FOAM MEPILX PST OP 4X12IN --

## (undated) DEVICE — REM POLYHESIVE ADULT PATIENT RETURN ELECTRODE: Brand: VALLEYLAB

## (undated) DEVICE — HANDPIECE SET WITH FAN SPRAY TIP: Brand: INTERPULSE

## (undated) DEVICE — SUT VCRL + 2-0 27IN CT2 UD -- 36/BX

## (undated) DEVICE — GARMENT COMPR M FOR 13IN FT INTMIT SGL BLDR HEM FORC II

## (undated) DEVICE — ZIMMER® STERILE DISPOSABLE TOURNIQUET CUFF WITH PROTECTIVE SLEEVE AND PLC, SINGLE PORT, SINGLE BLADDER, 34 IN. (86 CM)

## (undated) DEVICE — SYSTEM SKIN CLSR 22CM DERMBND PRINEO